# Patient Record
Sex: FEMALE | Race: WHITE | NOT HISPANIC OR LATINO
[De-identification: names, ages, dates, MRNs, and addresses within clinical notes are randomized per-mention and may not be internally consistent; named-entity substitution may affect disease eponyms.]

---

## 2017-03-08 PROBLEM — Z00.00 ENCOUNTER FOR PREVENTIVE HEALTH EXAMINATION: Status: ACTIVE | Noted: 2017-03-08

## 2017-03-09 ENCOUNTER — APPOINTMENT (OUTPATIENT)
Dept: SURGERY | Facility: CLINIC | Age: 37
End: 2017-03-09

## 2018-12-15 ENCOUNTER — OUTPATIENT (OUTPATIENT)
Dept: OUTPATIENT SERVICES | Facility: HOSPITAL | Age: 38
LOS: 1 days | Discharge: HOME | End: 2018-12-15

## 2018-12-15 DIAGNOSIS — R52 PAIN, UNSPECIFIED: ICD-10-CM

## 2018-12-17 ENCOUNTER — TRANSCRIPTION ENCOUNTER (OUTPATIENT)
Age: 38
End: 2018-12-17

## 2019-01-30 ENCOUNTER — OUTPATIENT (OUTPATIENT)
Dept: OUTPATIENT SERVICES | Facility: HOSPITAL | Age: 39
LOS: 1 days | Discharge: HOME | End: 2019-01-30

## 2019-01-30 DIAGNOSIS — E55.9 VITAMIN D DEFICIENCY, UNSPECIFIED: ICD-10-CM

## 2019-01-30 DIAGNOSIS — Z00.00 ENCOUNTER FOR GENERAL ADULT MEDICAL EXAMINATION WITHOUT ABNORMAL FINDINGS: ICD-10-CM

## 2019-02-05 ENCOUNTER — APPOINTMENT (OUTPATIENT)
Dept: GYNECOLOGIC ONCOLOGY | Facility: CLINIC | Age: 39
End: 2019-02-05

## 2019-02-05 ENCOUNTER — OUTPATIENT (OUTPATIENT)
Dept: OUTPATIENT SERVICES | Facility: HOSPITAL | Age: 39
LOS: 1 days | Discharge: HOME | End: 2019-02-05

## 2019-02-05 VITALS
DIASTOLIC BLOOD PRESSURE: 91 MMHG | BODY MASS INDEX: 32.2 KG/M2 | TEMPERATURE: 97.3 F | RESPIRATION RATE: 18 BRPM | HEIGHT: 62 IN | HEART RATE: 74 BPM | WEIGHT: 175 LBS | SYSTOLIC BLOOD PRESSURE: 140 MMHG

## 2019-02-05 DIAGNOSIS — Z80.49 FAMILY HISTORY OF MALIGNANT NEOPLASM OF OTHER GENITAL ORGANS: ICD-10-CM

## 2019-03-07 DIAGNOSIS — D25.9 LEIOMYOMA OF UTERUS, UNSPECIFIED: ICD-10-CM

## 2019-03-08 ENCOUNTER — TRANSCRIPTION ENCOUNTER (OUTPATIENT)
Age: 39
End: 2019-03-08

## 2019-03-12 ENCOUNTER — APPOINTMENT (OUTPATIENT)
Dept: GYNECOLOGIC ONCOLOGY | Facility: CLINIC | Age: 39
End: 2019-03-12

## 2019-03-12 NOTE — DISCUSSION/SUMMARY
[FreeTextEntry1] : 40 yo P2 with urinary urgency/frequency and pelvic pressure\par - symptoms possibly due to fibroid uterus however unlikely due to decreasing fibroid size. \par - recc f/u with urogynecology for urinary symptoms, possibly urodynamics\par - RTC 6-8 weeks

## 2019-03-12 NOTE — HISTORY OF PRESENT ILLNESS
[FreeTextEntry1] : 40yo with h/o chronic pelvic pain \par \par 12/15/2018: TVUS: enlarged heterogenous uterus, 11x6x5.6cm with submucosal calcified fibroid 3.6x3.2x3.9cm. ES 0.44cm. Right ovary with 1.3c, septated cyst. Left ovary WNL. No adnexal masses of free pelvic fluid. \par

## 2019-03-12 NOTE — HISTORY OF PRESENT ILLNESS
[FreeTextEntry1] : 40 yo P2, referred from Dr. Jack for pelvic pain\par \par Reports pelvic pressure x 3 months, unchanged, worse at night. Reports pelvic pain, dull/aching, 5/10 worse at night when laying supine, denies previous h/o similar pain. Denies taking medication for pain. \par Does not feel symptoms are related to bowel/urinary habits. \par Periods are regular, LMP 19. Denies any AUB. Reports h/o ovarian cysts that have been small (< 3cm) and resolved on their own. Sexually active, on OCP's x 1 month. \par H/o of fibroid previously 8 cm decreasing in size to 5 cm. \par Reports urinary frequency, 20 voids during the day, and nocturia (2x nightly). Denies dysuria, hematuria. Reports pelvic pressure sensation unrelated to feeling of urge to urinate. Drinks coffee 2 cups daily. Reports leaking of urine with urgency and leaking with coughing. Denies sensation of incomplete emptying. Bowel movements are regular, currently with some constipation. \par Last pap smear: 2019: NILM\par denies any weight changes. \par \par Imagin/15/2018: TVUS: enlarged heterogenous uterus, 11x6x5.6cm with submucosal calcified fibroid 3.6x3.2x3.9cm. ES 0.44cm. Right ovary with 1.3c, septated cyst. Left ovary WNL. No adnexal masses of free pelvic fluid.

## 2019-03-16 ENCOUNTER — EMERGENCY (EMERGENCY)
Facility: HOSPITAL | Age: 39
LOS: 0 days | Discharge: HOME | End: 2019-03-16
Attending: EMERGENCY MEDICINE | Admitting: EMERGENCY MEDICINE

## 2019-03-16 VITALS
OXYGEN SATURATION: 97 % | DIASTOLIC BLOOD PRESSURE: 87 MMHG | TEMPERATURE: 98 F | RESPIRATION RATE: 20 BRPM | SYSTOLIC BLOOD PRESSURE: 141 MMHG | HEART RATE: 94 BPM

## 2019-03-16 DIAGNOSIS — Z79.2 LONG TERM (CURRENT) USE OF ANTIBIOTICS: ICD-10-CM

## 2019-03-16 DIAGNOSIS — H57.89 OTHER SPECIFIED DISORDERS OF EYE AND ADNEXA: ICD-10-CM

## 2019-03-16 DIAGNOSIS — L03.213 PERIORBITAL CELLULITIS: ICD-10-CM

## 2019-03-16 DIAGNOSIS — H10.9 UNSPECIFIED CONJUNCTIVITIS: ICD-10-CM

## 2019-03-16 NOTE — ED PROVIDER NOTE - NSFOLLOWUPINSTRUCTIONS_ED_ALL_ED_FT
Cellulitis    Cellulitis is a skin infection caused by bacteria. This condition occurs most often in the arms and lower legs but can occur anywhere over the body. Symptoms include redness, swelling, warm skin, tenderness, and chills/fever. If you were prescribed an antibiotic medicine, take it as told by your health care provider. Do not stop taking the antibiotic even if you start to feel better.    SEEK IMMEDIATE MEDICAL CARE IF YOU HAVE ANY OF THE FOLLOWING SYMPTOMS: worsening fever, red streaks coming from affected area, vomiting or diarrhea, or dizziness/lightheadedness.     Conjunctivitis    Conjunctivitis is an inflammation of the clear membrane that covers the white part of your eye and the inner surface of your eyelid (conjunctiva). Symptoms include eye redness, eye pain, tearing and drainage, crusting of eyelids, swollen eyelids, and light sensitivity. Conjunctivitis may be contagious and easily spread from person to person. It can be caused by a virus, bacteria, or as part of an allergic reaction; the treatment depends on the type of conjunctivitis suspected. Avoid touching or rubbing your eyes and wipe away any drainage gently with a warm wet washcloth. Do not wear contact lenses until the inflammation is gone – wear glasses until your health care provider says it is safe to wear contact again. Do not share towels or washcloths that may spread the infection and wash your hands frequently.    SEEK IMMEDIATE MEDICAL CARE IF YOU HAVE ANY OF THE FOLLOWING SYMPTOMS: increasing pain, blurry vision, blindness, fever, or facial pain/redness/swelling.

## 2019-03-16 NOTE — ED PROVIDER NOTE - OBJECTIVE STATEMENT
39F p/w R eye redness. Per pt she began having eye redness, FB sensation and yellow discharge on Wednesday. Pt was diagnosed w/ conjunctivitis by PMD and started on ofloxacin drops. 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 Pt came to ED tonight because she felt like she had something in her eye, however the sensation resolved soon after arriving to ED. Pt denies f/c, pain w/ EOM, decreased vision, contact lens use.

## 2019-03-16 NOTE — ED PROVIDER NOTE - PHYSICAL EXAMINATION
General: AOx4, Non toxic appearing, NAD, speaking in full sentences.   Skin: mild erythema, swelling and redness around R eye  Head:  Normocephalic, atraumatic.   EENT: PERRL/EOMI w/out pain. R eye conjunctiva and sclera clear injected w/ watery and scant yellowish discharge. No FB visualized. Visual acuity intact.   Psych: Cooperative, appropriate

## 2019-03-16 NOTE — ED ADULT NURSE NOTE - OBJECTIVE STATEMENT
Eye redness with yellow drainage. Was diagnosed with conjunctivitis by md with no resolution after abx. Presented with eye swelling as well. no visual changes, halos, floaters.

## 2019-03-16 NOTE — ED PROVIDER NOTE - ATTENDING CONTRIBUTION TO CARE
pt with conjunctivitis, on po and gtts abx, felt fb in eye. relief while in ED. no pain with eom. no fever. ptin nad, injected rt eye with mild periorb edema. nt. no proptosis. no fb seen onlid eversion. no abrasion. cornea and iris nml. no pus. va nml. no deficits.  will dc home, outpt f/u.

## 2019-03-16 NOTE — ED PROVIDER NOTE - NSFOLLOWUPCLINICS_GEN_ALL_ED_FT
[facility]  Ophthalmolgy  [address], [address]  [place] [address]  Phone: [phone]  Fax:   Follow Up Time:

## 2020-04-19 ENCOUNTER — RESULT REVIEW (OUTPATIENT)
Age: 40
End: 2020-04-19

## 2020-04-26 ENCOUNTER — MESSAGE (OUTPATIENT)
Age: 40
End: 2020-04-26

## 2020-05-05 ENCOUNTER — APPOINTMENT (OUTPATIENT)
Dept: DISASTER EMERGENCY | Facility: CLINIC | Age: 40
End: 2020-05-05

## 2020-05-11 ENCOUNTER — APPOINTMENT (OUTPATIENT)
Dept: CARDIOLOGY | Facility: CLINIC | Age: 40
End: 2020-05-11
Payer: COMMERCIAL

## 2020-05-11 VITALS — DIASTOLIC BLOOD PRESSURE: 101 MMHG | HEART RATE: 67 BPM | SYSTOLIC BLOOD PRESSURE: 160 MMHG

## 2020-05-11 PROCEDURE — 99204 OFFICE O/P NEW MOD 45 MIN: CPT | Mod: 95

## 2020-05-11 RX ORDER — LABETALOL HYDROCHLORIDE 100 MG/1
100 TABLET, FILM COATED ORAL
Refills: 0 | Status: DISCONTINUED | COMMUNITY
Start: 2020-05-11 | End: 2020-05-11

## 2020-05-11 NOTE — HISTORY OF PRESENT ILLNESS
[FreeTextEntry1] : The patient has had no previous cardiac history . She did not have history of HTN . The patient had a   . It was noticed that at her 6 week post partum appointment was on 2020 . The patient was noted to have a BP of 140/90. She was started on Labetalol and this was increased to 200 mg BID and 100 mg at mid day . The patient said that she had some proteinuria . She had no definitive edema

## 2020-05-11 NOTE — REVIEW OF SYSTEMS
[Shortness Of Breath] : shortness of breath [Chest Pain] : chest pain [Negative] : Heme/Lymph [Palpitations] : no palpitations

## 2020-05-11 NOTE — PHYSICAL EXAM
[General Appearance - Well Developed] : well developed [Normal Appearance] : normal appearance [Well Groomed] : well groomed [General Appearance - Well Nourished] : well nourished [No Deformities] : no deformities [General Appearance - In No Acute Distress] : no acute distress [Eyelids - No Xanthelasma] : the eyelids demonstrated no xanthelasmas [Normal Conjunctiva] : the conjunctiva exhibited no abnormalities [Normal Oral Mucosa] : normal oral mucosa [No Oral Cyanosis] : no oral cyanosis [No Oral Pallor] : no oral pallor [Respiration, Rhythm And Depth] : normal respiratory rhythm and effort [Exaggerated Use Of Accessory Muscles For Inspiration] : no accessory muscle use [Cyanosis, Localized] : no localized cyanosis [Nail Clubbing] : no clubbing of the fingernails [Petechial Hemorrhages (___cm)] : no petechial hemorrhages [Skin Color & Pigmentation] : normal skin color and pigmentation [] : no rash [Skin Lesions] : no skin lesions [No Venous Stasis] : no venous stasis [No Skin Ulcers] : no skin ulcer [No Xanthoma] : no  xanthoma was observed [Oriented To Time, Place, And Person] : oriented to person, place, and time [Affect] : the affect was normal [Mood] : the mood was normal [No Anxiety] : not feeling anxious [FreeTextEntry1] : No JVD

## 2020-05-20 LAB
ALBUMIN SERPL ELPH-MCNC: 4.6 G/DL
ALP BLD-CCNC: 86 U/L
ALT SERPL-CCNC: 40 U/L
ANION GAP SERPL CALC-SCNC: 15 MMOL/L
AST SERPL-CCNC: 26 U/L
BASOPHILS # BLD AUTO: 0.05 K/UL
BASOPHILS NFR BLD AUTO: 0.9 %
BILIRUB SERPL-MCNC: 0.3 MG/DL
BUN SERPL-MCNC: 13 MG/DL
CALCIUM SERPL-MCNC: 9.1 MG/DL
CHLORIDE SERPL-SCNC: 98 MMOL/L
CHOLEST SERPL-MCNC: 196 MG/DL
CHOLEST/HDLC SERPL: 4.9 RATIO
CO2 SERPL-SCNC: 24 MMOL/L
CREAT SERPL-MCNC: 0.7 MG/DL
EOSINOPHIL # BLD AUTO: 0.08 K/UL
EOSINOPHIL NFR BLD AUTO: 1.4 %
GLUCOSE SERPL-MCNC: 80 MG/DL
HCT VFR BLD CALC: 38.3 %
HDLC SERPL-MCNC: 40 MG/DL
HGB BLD-MCNC: 12.6 G/DL
IMM GRANULOCYTES NFR BLD AUTO: 0.2 %
LDLC SERPL CALC-MCNC: 140 MG/DL
LYMPHOCYTES # BLD AUTO: 2.11 K/UL
LYMPHOCYTES NFR BLD AUTO: 37.7 %
MAN DIFF?: NORMAL
MCHC RBC-ENTMCNC: 28.1 PG
MCHC RBC-ENTMCNC: 32.9 G/DL
MCV RBC AUTO: 85.3 FL
MONOCYTES # BLD AUTO: 0.68 K/UL
MONOCYTES NFR BLD AUTO: 12.1 %
NEUTROPHILS # BLD AUTO: 2.67 K/UL
NEUTROPHILS NFR BLD AUTO: 47.7 %
PLATELET # BLD AUTO: 285 K/UL
POTASSIUM SERPL-SCNC: 4.2 MMOL/L
PROT SERPL-MCNC: 7.2 G/DL
RBC # BLD: 4.49 M/UL
RBC # FLD: 12.9 %
SODIUM SERPL-SCNC: 137 MMOL/L
TRIGL SERPL-MCNC: 163 MG/DL
WBC # FLD AUTO: 5.6 K/UL

## 2020-05-26 ENCOUNTER — APPOINTMENT (OUTPATIENT)
Dept: CARDIOLOGY | Facility: CLINIC | Age: 40
End: 2020-05-26
Payer: COMMERCIAL

## 2020-05-26 VITALS — DIASTOLIC BLOOD PRESSURE: 80 MMHG | SYSTOLIC BLOOD PRESSURE: 120 MMHG

## 2020-05-26 VITALS
BODY MASS INDEX: 36.07 KG/M2 | DIASTOLIC BLOOD PRESSURE: 80 MMHG | HEIGHT: 62 IN | WEIGHT: 196 LBS | HEART RATE: 76 BPM | SYSTOLIC BLOOD PRESSURE: 130 MMHG | TEMPERATURE: 98 F

## 2020-05-26 LAB
T4 FREE SERPL-MCNC: 1 NG/DL
TSH SERPL-ACNC: 3.15 UIU/ML

## 2020-05-26 PROCEDURE — 99214 OFFICE O/P EST MOD 30 MIN: CPT

## 2020-05-26 PROCEDURE — 93000 ELECTROCARDIOGRAM COMPLETE: CPT

## 2020-05-26 PROCEDURE — 93306 TTE W/DOPPLER COMPLETE: CPT

## 2020-05-26 NOTE — PHYSICAL EXAM
[General Appearance - Well Developed] : well developed [Normal Appearance] : normal appearance [Well Groomed] : well groomed [General Appearance - Well Nourished] : well nourished [No Deformities] : no deformities [General Appearance - In No Acute Distress] : no acute distress [Normal Conjunctiva] : the conjunctiva exhibited no abnormalities [Eyelids - No Xanthelasma] : the eyelids demonstrated no xanthelasmas [Normal Oral Mucosa] : normal oral mucosa [No Oral Pallor] : no oral pallor [No Oral Cyanosis] : no oral cyanosis [Respiration, Rhythm And Depth] : normal respiratory rhythm and effort [Exaggerated Use Of Accessory Muscles For Inspiration] : no accessory muscle use [Nail Clubbing] : no clubbing of the fingernails [Cyanosis, Localized] : no localized cyanosis [Petechial Hemorrhages (___cm)] : no petechial hemorrhages [Skin Color & Pigmentation] : normal skin color and pigmentation [] : no rash [Skin Lesions] : no skin lesions [No Venous Stasis] : no venous stasis [No Xanthoma] : no  xanthoma was observed [No Skin Ulcers] : no skin ulcer [Mood] : the mood was normal [Affect] : the affect was normal [Oriented To Time, Place, And Person] : oriented to person, place, and time [No Anxiety] : not feeling anxious [FreeTextEntry1] : No JVD

## 2020-05-26 NOTE — ASSESSMENT
[FreeTextEntry1] : The patient has had HTN of pregnancy possible preeclampsia . BP has been well controlled on current medication . No edema . No SOB . Had recent echo at [facility] outpatient facility .

## 2020-05-26 NOTE — HISTORY OF PRESENT ILLNESS
[FreeTextEntry1] : The patient has had no previous cardiac history . She did not have history of HTN . The patient had a   . It was noticed that at her 6 week post partum appointment was on 2020 . The patient was noted to have a BP of 140/90. She was started on Labetalol and this was increased to 200 mg BID and 100 mg at mid day . The patient said that she had some proteinuria . She had no definitive edema \par The patient 's BP has been well controlled on her home BP monitor . No SOB

## 2020-06-22 ENCOUNTER — APPOINTMENT (OUTPATIENT)
Dept: CARDIOLOGY | Facility: CLINIC | Age: 40
End: 2020-06-22
Payer: COMMERCIAL

## 2020-06-22 VITALS — SYSTOLIC BLOOD PRESSURE: 128 MMHG | DIASTOLIC BLOOD PRESSURE: 70 MMHG

## 2020-06-22 VITALS — TEMPERATURE: 98.6 F | HEART RATE: 69 BPM | HEIGHT: 60 IN

## 2020-06-22 VITALS — WEIGHT: 190 LBS | BODY MASS INDEX: 34.96 KG/M2 | HEIGHT: 62 IN

## 2020-06-22 DIAGNOSIS — R35.0 FREQUENCY OF MICTURITION: ICD-10-CM

## 2020-06-22 DIAGNOSIS — R10.2 PELVIC AND PERINEAL PAIN: ICD-10-CM

## 2020-06-22 DIAGNOSIS — Z78.9 OTHER SPECIFIED HEALTH STATUS: ICD-10-CM

## 2020-06-22 PROCEDURE — 93000 ELECTROCARDIOGRAM COMPLETE: CPT

## 2020-06-22 PROCEDURE — 99213 OFFICE O/P EST LOW 20 MIN: CPT

## 2020-06-22 NOTE — REVIEW OF SYSTEMS
[Chest Pain] : chest pain [Shortness Of Breath] : shortness of breath [Negative] : Heme/Lymph [Palpitations] : no palpitations

## 2020-06-22 NOTE — PHYSICAL EXAM
[Normal Appearance] : normal appearance [General Appearance - Well Developed] : well developed [Well Groomed] : well groomed [General Appearance - Well Nourished] : well nourished [No Deformities] : no deformities [Eyelids - No Xanthelasma] : the eyelids demonstrated no xanthelasmas [Normal Conjunctiva] : the conjunctiva exhibited no abnormalities [General Appearance - In No Acute Distress] : no acute distress [No Oral Pallor] : no oral pallor [Normal Oral Mucosa] : normal oral mucosa [No Oral Cyanosis] : no oral cyanosis [Exaggerated Use Of Accessory Muscles For Inspiration] : no accessory muscle use [Respiration, Rhythm And Depth] : normal respiratory rhythm and effort [Nail Clubbing] : no clubbing of the fingernails [Cyanosis, Localized] : no localized cyanosis [Petechial Hemorrhages (___cm)] : no petechial hemorrhages [Skin Color & Pigmentation] : normal skin color and pigmentation [] : no rash [No Venous Stasis] : no venous stasis [Skin Lesions] : no skin lesions [No Skin Ulcers] : no skin ulcer [No Xanthoma] : no  xanthoma was observed [Oriented To Time, Place, And Person] : oriented to person, place, and time [Mood] : the mood was normal [Affect] : the affect was normal [No Anxiety] : not feeling anxious [FreeTextEntry1] : No JVD

## 2020-07-10 ENCOUNTER — EMERGENCY (EMERGENCY)
Facility: HOSPITAL | Age: 40
LOS: 0 days | Discharge: HOME | End: 2020-07-10
Attending: EMERGENCY MEDICINE | Admitting: EMERGENCY MEDICINE
Payer: COMMERCIAL

## 2020-07-10 VITALS
HEART RATE: 84 BPM | DIASTOLIC BLOOD PRESSURE: 86 MMHG | OXYGEN SATURATION: 100 % | TEMPERATURE: 98 F | SYSTOLIC BLOOD PRESSURE: 144 MMHG | WEIGHT: 184.97 LBS | RESPIRATION RATE: 16 BRPM

## 2020-07-10 DIAGNOSIS — R10.13 EPIGASTRIC PAIN: ICD-10-CM

## 2020-07-10 DIAGNOSIS — R10.9 UNSPECIFIED ABDOMINAL PAIN: ICD-10-CM

## 2020-07-10 DIAGNOSIS — Z91.018 ALLERGY TO OTHER FOODS: ICD-10-CM

## 2020-07-10 DIAGNOSIS — R11.0 NAUSEA: ICD-10-CM

## 2020-07-10 LAB
ALBUMIN SERPL ELPH-MCNC: 4.4 G/DL — SIGNIFICANT CHANGE UP (ref 3.5–5.2)
ALP SERPL-CCNC: 72 U/L — SIGNIFICANT CHANGE UP (ref 30–115)
ALT FLD-CCNC: 19 U/L — SIGNIFICANT CHANGE UP (ref 0–41)
ANION GAP SERPL CALC-SCNC: 12 MMOL/L — SIGNIFICANT CHANGE UP (ref 7–14)
APPEARANCE UR: CLEAR — SIGNIFICANT CHANGE UP
AST SERPL-CCNC: 17 U/L — SIGNIFICANT CHANGE UP (ref 0–41)
BACTERIA # UR AUTO: NEGATIVE — SIGNIFICANT CHANGE UP
BASOPHILS # BLD AUTO: 0.03 K/UL — SIGNIFICANT CHANGE UP (ref 0–0.2)
BASOPHILS NFR BLD AUTO: 0.5 % — SIGNIFICANT CHANGE UP (ref 0–1)
BILIRUB SERPL-MCNC: 0.4 MG/DL — SIGNIFICANT CHANGE UP (ref 0.2–1.2)
BILIRUB UR-MCNC: NEGATIVE — SIGNIFICANT CHANGE UP
BUN SERPL-MCNC: 14 MG/DL — SIGNIFICANT CHANGE UP (ref 10–20)
CALCIUM SERPL-MCNC: 9.1 MG/DL — SIGNIFICANT CHANGE UP (ref 8.5–10.1)
CHLORIDE SERPL-SCNC: 101 MMOL/L — SIGNIFICANT CHANGE UP (ref 98–110)
CO2 SERPL-SCNC: 26 MMOL/L — SIGNIFICANT CHANGE UP (ref 17–32)
COLOR SPEC: YELLOW — SIGNIFICANT CHANGE UP
CREAT SERPL-MCNC: 0.8 MG/DL — SIGNIFICANT CHANGE UP (ref 0.7–1.5)
DIFF PNL FLD: SIGNIFICANT CHANGE UP
EOSINOPHIL # BLD AUTO: 0.06 K/UL — SIGNIFICANT CHANGE UP (ref 0–0.7)
EOSINOPHIL NFR BLD AUTO: 1 % — SIGNIFICANT CHANGE UP (ref 0–8)
EPI CELLS # UR: 10 /HPF — HIGH (ref 0–5)
GLUCOSE SERPL-MCNC: 95 MG/DL — SIGNIFICANT CHANGE UP (ref 70–99)
GLUCOSE UR QL: NEGATIVE — SIGNIFICANT CHANGE UP
HCT VFR BLD CALC: 37.8 % — SIGNIFICANT CHANGE UP (ref 37–47)
HGB BLD-MCNC: 12.8 G/DL — SIGNIFICANT CHANGE UP (ref 12–16)
HYALINE CASTS # UR AUTO: 6 /LPF — SIGNIFICANT CHANGE UP (ref 0–7)
IMM GRANULOCYTES NFR BLD AUTO: 0.2 % — SIGNIFICANT CHANGE UP (ref 0.1–0.3)
KETONES UR-MCNC: SIGNIFICANT CHANGE UP
LACTATE SERPL-SCNC: 0.7 MMOL/L — SIGNIFICANT CHANGE UP (ref 0.7–2)
LEUKOCYTE ESTERASE UR-ACNC: NEGATIVE — SIGNIFICANT CHANGE UP
LIDOCAIN IGE QN: 40 U/L — SIGNIFICANT CHANGE UP (ref 7–60)
LYMPHOCYTES # BLD AUTO: 2.38 K/UL — SIGNIFICANT CHANGE UP (ref 1.2–3.4)
LYMPHOCYTES # BLD AUTO: 41 % — SIGNIFICANT CHANGE UP (ref 20.5–51.1)
MCHC RBC-ENTMCNC: 28.2 PG — SIGNIFICANT CHANGE UP (ref 27–31)
MCHC RBC-ENTMCNC: 33.9 G/DL — SIGNIFICANT CHANGE UP (ref 32–37)
MCV RBC AUTO: 83.3 FL — SIGNIFICANT CHANGE UP (ref 81–99)
MONOCYTES # BLD AUTO: 0.51 K/UL — SIGNIFICANT CHANGE UP (ref 0.1–0.6)
MONOCYTES NFR BLD AUTO: 8.8 % — SIGNIFICANT CHANGE UP (ref 1.7–9.3)
NEUTROPHILS # BLD AUTO: 2.81 K/UL — SIGNIFICANT CHANGE UP (ref 1.4–6.5)
NEUTROPHILS NFR BLD AUTO: 48.5 % — SIGNIFICANT CHANGE UP (ref 42.2–75.2)
NITRITE UR-MCNC: NEGATIVE — SIGNIFICANT CHANGE UP
NRBC # BLD: 0 /100 WBCS — SIGNIFICANT CHANGE UP (ref 0–0)
PH UR: 6 — SIGNIFICANT CHANGE UP (ref 5–8)
PLATELET # BLD AUTO: 288 K/UL — SIGNIFICANT CHANGE UP (ref 130–400)
POTASSIUM SERPL-MCNC: 4.1 MMOL/L — SIGNIFICANT CHANGE UP (ref 3.5–5)
POTASSIUM SERPL-SCNC: 4.1 MMOL/L — SIGNIFICANT CHANGE UP (ref 3.5–5)
PROT SERPL-MCNC: 7.2 G/DL — SIGNIFICANT CHANGE UP (ref 6–8)
PROT UR-MCNC: ABNORMAL
RBC # BLD: 4.54 M/UL — SIGNIFICANT CHANGE UP (ref 4.2–5.4)
RBC # FLD: 12.8 % — SIGNIFICANT CHANGE UP (ref 11.5–14.5)
RBC CASTS # UR COMP ASSIST: 2 /HPF — SIGNIFICANT CHANGE UP (ref 0–4)
SODIUM SERPL-SCNC: 139 MMOL/L — SIGNIFICANT CHANGE UP (ref 135–146)
SP GR SPEC: 1.03 — HIGH (ref 1.01–1.02)
UROBILINOGEN FLD QL: SIGNIFICANT CHANGE UP
WBC # BLD: 5.8 K/UL — SIGNIFICANT CHANGE UP (ref 4.8–10.8)
WBC # FLD AUTO: 5.8 K/UL — SIGNIFICANT CHANGE UP (ref 4.8–10.8)
WBC UR QL: 3 /HPF — SIGNIFICANT CHANGE UP (ref 0–5)

## 2020-07-10 PROCEDURE — 93010 ELECTROCARDIOGRAM REPORT: CPT

## 2020-07-10 PROCEDURE — 99285 EMERGENCY DEPT VISIT HI MDM: CPT

## 2020-07-10 PROCEDURE — 76705 ECHO EXAM OF ABDOMEN: CPT | Mod: 26

## 2020-07-10 NOTE — ED PROVIDER NOTE - PATIENT PORTAL LINK FT
You can access the FollowMyHealth Patient Portal offered by [facility] by registering at the following website: [email or web address]. By joining [facility] FollowMyHealth portal, you will also be able to view your health information using other applications (apps) compatible with our system.

## 2020-07-10 NOTE — ED PROVIDER NOTE - PHYSICAL EXAMINATION
CONSTITUTIONAL: Well-developed; well-nourished; in no acute distress, nontoxic appearing, talking comfortably in full sentences  SKIN: skin exam is warm and dry,  HEAD: Normocephalic; atraumatic.  EYES: PERRL, 3 mm bilateral, no nystagmus, EOM intact; conjunctiva and sclera clear.  ENT: MMM, no nasal congestion  NECK: Supple; non tender.+ full passive ROM in all directions. No JVD  CARD: S1, S2 normal, no murmur  RESP: No wheezes, rales or rhonchi. Good air movement bilaterally  ABD: soft; non-distended; + mild epigastric ttp, no guarding or rebound, no cvat b/l   EXT: Normal ROM. No cyanosis or edema. Dp Pulses intact.   NEURO: awake, alert, following commands, oriented, grossly unremarkable. No Focal deficits. GCS 15.   PSYCH: Cooperative, appropriate.

## 2020-07-10 NOTE — ED PROVIDER NOTE - OBJECTIVE STATEMENT
40 F with hx of post partum preeclampsia on Labetalol and nifedipine with complaints of 2 days of intermittent non radiating epigastric abd pain. Associated with mild nausea, no vomiting. Denies symptoms associated with eating. No fever, diarrhea, urinary complaints. Menstrual period regular. Pshx of appendectomy.

## 2020-07-10 NOTE — ED PROVIDER NOTE - CLINICAL SUMMARY MEDICAL DECISION MAKING FREE TEXT BOX
Pt presented with 2 days of epigastric abd pain. Required labs, imaging. No acute findings. Will be d/tex with outpt f/up.    ED evaluation and management discussed with the patient and family (if available) in detail.  Close PMD follow up encouraged.  Strict ED return instructions discussed in detail and patient given the opportunity to ask any questions about their discharge diagnosis and instructions. Patient verbalized understanding.

## 2020-07-10 NOTE — ED ADULT NURSE NOTE - NSIMPLEMENTINTERV_GEN_ALL_ED
Implemented All Universal Safety Interventions:  [place]illow to call system. Call bell, personal items and telephone within reach. Instruct patient to call for assistance. Room bathroom lighting operational. Non-slip footwear when patient is off stretcher. Physically safe environment: no spills, clutter or unnecessary equipment. Stretcher in lowest position, wheels locked, appropriate side rails in place.

## 2020-07-10 NOTE — ED PROVIDER NOTE - CARE PROVIDER_API CALL
Wilmer Lyles  GASTROENTEROLOGY  [address] [place] [address]  Phone: [phone]  Fax: [phone]  Follow Up Time: 7-10 Days

## 2020-07-10 NOTE — ED ADULT TRIAGE NOTE - MEANS OF ARRIVAL
Pt lying in bed resting at this time. Respirations even and unlabored. No s/sx of pain or distress. Report to be given to oncoming nurse. ambulatory

## 2020-07-28 ENCOUNTER — APPOINTMENT (OUTPATIENT)
Dept: CARDIOLOGY | Facility: CLINIC | Age: 40
End: 2020-07-28
Payer: COMMERCIAL

## 2020-07-28 VITALS — SYSTOLIC BLOOD PRESSURE: 118 MMHG | DIASTOLIC BLOOD PRESSURE: 78 MMHG

## 2020-07-28 VITALS
WEIGHT: 186 LBS | HEIGHT: 62 IN | BODY MASS INDEX: 34.23 KG/M2 | DIASTOLIC BLOOD PRESSURE: 78 MMHG | SYSTOLIC BLOOD PRESSURE: 118 MMHG | TEMPERATURE: 98.1 F

## 2020-07-28 PROCEDURE — 99213 OFFICE O/P EST LOW 20 MIN: CPT

## 2020-07-28 RX ORDER — NIFEDIPINE 30 MG/1
30 TABLET, EXTENDED RELEASE ORAL
Qty: 30 | Refills: 6 | Status: DISCONTINUED | COMMUNITY
Start: 2020-05-11 | End: 2020-07-28

## 2020-07-28 NOTE — ASSESSMENT
[FreeTextEntry1] : The patient has had good control of her BP . At time she goes as low as 100 systolic . Did not feel well when she had that BP .

## 2020-07-28 NOTE — HISTORY OF PRESENT ILLNESS
[FreeTextEntry1] : The patient has had good control of BP at home . some dizziness when she bends down and stands back up .

## 2020-07-28 NOTE — PHYSICAL EXAM
[General Appearance - Well Developed] : well developed [Normal Appearance] : normal appearance [General Appearance - Well Nourished] : well nourished [No Deformities] : no deformities [Well Groomed] : well groomed [Normal Conjunctiva] : the conjunctiva exhibited no abnormalities [General Appearance - In No Acute Distress] : no acute distress [Eyelids - No Xanthelasma] : the eyelids demonstrated no xanthelasmas [Normal Oral Mucosa] : normal oral mucosa [No Oral Cyanosis] : no oral cyanosis [No Oral Pallor] : no oral pallor [Respiration, Rhythm And Depth] : normal respiratory rhythm and effort [Exaggerated Use Of Accessory Muscles For Inspiration] : no accessory muscle use [Cyanosis, Localized] : no localized cyanosis [Nail Clubbing] : no clubbing of the fingernails [Petechial Hemorrhages (___cm)] : no petechial hemorrhages [Skin Color & Pigmentation] : normal skin color and pigmentation [No Venous Stasis] : no venous stasis [] : no rash [Skin Lesions] : no skin lesions [No Skin Ulcers] : no skin ulcer [No Xanthoma] : no  xanthoma was observed [Affect] : the affect was normal [Oriented To Time, Place, And Person] : oriented to person, place, and time [Mood] : the mood was normal [No Anxiety] : not feeling anxious [FreeTextEntry1] : No JVD

## 2020-08-08 ENCOUNTER — APPOINTMENT (OUTPATIENT)
Dept: OBGYN | Facility: CLINIC | Age: 40
End: 2020-08-08
Payer: COMMERCIAL

## 2020-08-08 PROCEDURE — 81002 URINALYSIS NONAUTO W/O SCOPE: CPT

## 2020-08-08 PROCEDURE — 99213 OFFICE O/P EST LOW 20 MIN: CPT

## 2020-08-24 ENCOUNTER — APPOINTMENT (OUTPATIENT)
Dept: CARDIOLOGY | Facility: CLINIC | Age: 40
End: 2020-08-24
Payer: COMMERCIAL

## 2020-08-24 VITALS — WEIGHT: 186 LBS | HEIGHT: 62 IN | TEMPERATURE: 97.3 F | BODY MASS INDEX: 34.23 KG/M2

## 2020-08-24 VITALS — SYSTOLIC BLOOD PRESSURE: 118 MMHG | DIASTOLIC BLOOD PRESSURE: 82 MMHG

## 2020-08-24 VITALS — DIASTOLIC BLOOD PRESSURE: 82 MMHG | HEART RATE: 80 BPM | SYSTOLIC BLOOD PRESSURE: 118 MMHG

## 2020-08-24 PROCEDURE — 99213 OFFICE O/P EST LOW 20 MIN: CPT

## 2020-08-24 NOTE — HISTORY OF PRESENT ILLNESS
[FreeTextEntry1] : The patient has had good control of BP at home .  Increased here on home moitor only . For the most part her BP has been normal.

## 2020-08-24 NOTE — PHYSICAL EXAM
[General Appearance - Well Developed] : well developed [Normal Appearance] : normal appearance [Well Groomed] : well groomed [General Appearance - Well Nourished] : well nourished [No Deformities] : no deformities [General Appearance - In No Acute Distress] : no acute distress [Normal Conjunctiva] : the conjunctiva exhibited no abnormalities [Eyelids - No Xanthelasma] : the eyelids demonstrated no xanthelasmas [Normal Oral Mucosa] : normal oral mucosa [No Oral Pallor] : no oral pallor [No Oral Cyanosis] : no oral cyanosis [Respiration, Rhythm And Depth] : normal respiratory rhythm and effort [Exaggerated Use Of Accessory Muscles For Inspiration] : no accessory muscle use [Cyanosis, Localized] : no localized cyanosis [Nail Clubbing] : no clubbing of the fingernails [] : no rash [Petechial Hemorrhages (___cm)] : no petechial hemorrhages [Skin Color & Pigmentation] : normal skin color and pigmentation [No Venous Stasis] : no venous stasis [Skin Lesions] : no skin lesions [Oriented To Time, Place, And Person] : oriented to person, place, and time [No Skin Ulcers] : no skin ulcer [No Xanthoma] : no  xanthoma was observed [Affect] : the affect was normal [Mood] : the mood was normal [No Anxiety] : not feeling anxious [FreeTextEntry1] : No JVD

## 2020-10-30 ENCOUNTER — APPOINTMENT (OUTPATIENT)
Dept: OBGYN | Facility: CLINIC | Age: 40
End: 2020-10-30
Payer: SELF-PAY

## 2020-10-30 PROCEDURE — 99396 PREV VISIT EST AGE 40-64: CPT

## 2020-10-30 PROCEDURE — 81002 URINALYSIS NONAUTO W/O SCOPE: CPT

## 2020-10-30 PROCEDURE — 99072 ADDL SUPL MATRL&STAF TM PHE: CPT

## 2020-11-18 ENCOUNTER — APPOINTMENT (OUTPATIENT)
Dept: CARDIOLOGY | Facility: CLINIC | Age: 40
End: 2020-11-18
Payer: MEDICAID

## 2020-11-18 VITALS
BODY MASS INDEX: 34.23 KG/M2 | WEIGHT: 186 LBS | HEIGHT: 62 IN | DIASTOLIC BLOOD PRESSURE: 90 MMHG | SYSTOLIC BLOOD PRESSURE: 130 MMHG | TEMPERATURE: 95.6 F

## 2020-11-18 PROCEDURE — 99213 OFFICE O/P EST LOW 20 MIN: CPT

## 2020-11-18 NOTE — PHYSICAL EXAM
[General Appearance - Well Developed] : well developed [Normal Appearance] : normal appearance [Well Groomed] : well groomed [General Appearance - Well Nourished] : well nourished [No Deformities] : no deformities [General Appearance - In No Acute Distress] : no acute distress [Normal Conjunctiva] : the conjunctiva exhibited no abnormalities [Eyelids - No Xanthelasma] : the eyelids demonstrated no xanthelasmas [Normal Oral Mucosa] : normal oral mucosa [No Oral Pallor] : no oral pallor [No Oral Cyanosis] : no oral cyanosis [Respiration, Rhythm And Depth] : normal respiratory rhythm and effort [Exaggerated Use Of Accessory Muscles For Inspiration] : no accessory muscle use [Nail Clubbing] : no clubbing of the fingernails [Cyanosis, Localized] : no localized cyanosis [Petechial Hemorrhages (___cm)] : no petechial hemorrhages [Skin Color & Pigmentation] : normal skin color and pigmentation [] : no rash [No Venous Stasis] : no venous stasis [Skin Lesions] : no skin lesions [No Skin Ulcers] : no skin ulcer [No Xanthoma] : no  xanthoma was observed [Oriented To Time, Place, And Person] : oriented to person, place, and time [Affect] : the affect was normal [Mood] : the mood was normal [No Anxiety] : not feeling anxious [FreeTextEntry1] : No JVD

## 2020-12-03 ENCOUNTER — APPOINTMENT (OUTPATIENT)
Dept: GASTROENTEROLOGY | Facility: CLINIC | Age: 40
End: 2020-12-03

## 2020-12-10 ENCOUNTER — APPOINTMENT (OUTPATIENT)
Dept: NEUROLOGY | Facility: CLINIC | Age: 40
End: 2020-12-10
Payer: COMMERCIAL

## 2020-12-10 VITALS
TEMPERATURE: 97.1 F | OXYGEN SATURATION: 98 % | HEIGHT: 62 IN | DIASTOLIC BLOOD PRESSURE: 86 MMHG | SYSTOLIC BLOOD PRESSURE: 122 MMHG | HEART RATE: 105 BPM | WEIGHT: 180 LBS | BODY MASS INDEX: 33.13 KG/M2

## 2020-12-10 PROCEDURE — 99203 OFFICE O/P NEW LOW 30 MIN: CPT

## 2020-12-10 PROCEDURE — 99072 ADDL SUPL MATRL&STAF TM PHE: CPT

## 2020-12-10 NOTE — PHYSICAL EXAM
[FreeTextEntry1] : Physical examination:  \par General:   The patient is pleasant, cooperative, well dressed and in no acute distress.  Appearance is consistent with chronologic age.  No abnormal facies.\par Neurologic examination:  The patient is oriented to person, place, time and date.   Remote and recent memory is normal.   Fund of knowledge is intact and normal.  Language with normal repetition, comprehension and naming.  Nondysarthric. \par Cranial nerves examination: intact VA, VFF.  EOMI w/o nystagmus, skew or reported double vision.  PERRL.  No ptosis/weakness of eyelid closure.  Facial sensation is normal with normal bite.  No facial asymmetry.  Hearing grossly intact b/l.  Palate elevates midline.  Neck flexion/extension, SCM/Trap strength normal.  Tongue midline with full resistance.  \par Motor examination:   Normal tone, bulk and range of motion.  No tenderness, twitching, tremors or involuntary movements.      \par Formal Muscle Strength Testing: (MRC grade R/L) 5/5 ALEX, BB, TR, WF, WE, FF, FE; 5/5 ILP, QDS, HS, DF, PF.  Arises from sitting/squatting wnl.  Toe/heel walks.  \par Reflexes:   2+ b/l pectoralis, biceps, triceps, brachioradialis, patella and Achilles.  Plantar response downgoing b/l.  Jaw jerk, Nery, clonus absent.  \par Sensory examination:   Intact to light touch and pinprick, pain, temperature and proprioception and vibration in all extremities.    \par Cerebellum:   FTN/HKS intact with normal NITA in all limbs.   Gait is narrow based and normal with normal tandem.  Romberg (-).  No dysmetria or dysdiadokinesia.       \par sessile small lesion in the L occiput with TTP.  \par

## 2020-12-10 NOTE — ASSESSMENT
[FreeTextEntry1] : This is a 39 yo RHF w/ h/o pre-eclampsia/HTN currently p/w recurrent point TTP L occiput suspicious for lymphadenopathy r/o skull based lesion.  \par \par Recommendations:\par - Head CT NC\par - observe for triggers\par

## 2020-12-10 NOTE — HISTORY OF PRESENT ILLNESS
[FreeTextEntry1] : This is a 39 yo RHF currently p/w recurrent episodes of head pain localized to the L occiput with TTP and swelling lasting for a 3 - 4 days with spontaneous resolution.  Symptoms started last month and resolved now had similar symptoms this month.  Unclear if catamenial association.  Denies any radiation into the occiput or shoulder.  Denies any ear pain, tinnitus, hearing loss, vertigo or ataxia.  Pain localized and tender.  Denies any swelling or tenderness in other ricarda of the head or neck.  Denies any focal weakness or numbness.  Is otherwise in his usual state of health.

## 2020-12-21 ENCOUNTER — APPOINTMENT (OUTPATIENT)
Dept: CARDIOLOGY | Facility: CLINIC | Age: 40
End: 2020-12-21
Payer: COMMERCIAL

## 2020-12-21 VITALS — DIASTOLIC BLOOD PRESSURE: 82 MMHG | SYSTOLIC BLOOD PRESSURE: 110 MMHG

## 2020-12-21 PROCEDURE — 99213 OFFICE O/P EST LOW 20 MIN: CPT

## 2020-12-21 PROCEDURE — 99072 ADDL SUPL MATRL&STAF TM PHE: CPT

## 2020-12-21 NOTE — HISTORY OF PRESENT ILLNESS
[FreeTextEntry1] : 24 hour ambulatory BP monitor showed normal BP and configuration of white oat HTN . BP is high at work however she is stressed at work.

## 2021-01-05 ENCOUNTER — APPOINTMENT (OUTPATIENT)
Dept: SURGERY | Facility: CLINIC | Age: 41
End: 2021-01-05
Payer: COMMERCIAL

## 2021-01-05 ENCOUNTER — NON-APPOINTMENT (OUTPATIENT)
Age: 41
End: 2021-01-05

## 2021-01-05 VITALS
HEIGHT: 62 IN | TEMPERATURE: 97.7 F | BODY MASS INDEX: 34.04 KG/M2 | DIASTOLIC BLOOD PRESSURE: 85 MMHG | WEIGHT: 185 LBS | SYSTOLIC BLOOD PRESSURE: 120 MMHG

## 2021-01-05 DIAGNOSIS — N64.4 MASTODYNIA: ICD-10-CM

## 2021-01-05 DIAGNOSIS — R92.2 INCONCLUSIVE MAMMOGRAM: ICD-10-CM

## 2021-01-05 LAB — CYTOLOGY CVX/VAG DOC THIN PREP: NORMAL

## 2021-01-05 PROCEDURE — 99243 OFF/OP CNSLTJ NEW/EST LOW 30: CPT

## 2021-01-05 PROCEDURE — 99072 ADDL SUPL MATRL&STAF TM PHE: CPT

## 2021-01-05 NOTE — DATA REVIEWED
[FreeTextEntry1] : Patient's recent bilateral screening mammography report was noted.  Heterogeneously dense breast.  BI-RADS Category 1.

## 2021-01-05 NOTE — CONSULT LETTER
[Dear  ___] : Dear  [unfilled], [Consult Letter:] : I had the pleasure of evaluating your patient, [unfilled]. [Please see my note below.] : Please see my note below. [Consult Closing:] : Thank you very much for allowing me to participate in the care of this patient.  If you have any questions, please do not hesitate to contact me. [Sincerely,] : Sincerely, [DrDain  ___] : Dr. CAMPA [FreeTextEntry3] : Mrak Howard MD, FACS\par [facility]\[place] [address]\par

## 2021-01-05 NOTE — PAST MEDICAL HISTORY
[Menstruating] : The patient is menstruating [Menarche Age ____] : age at menarche was [unfilled] [Approximately ___] : the LMP was approximately [unfilled] [Regular Cycle Intervals] : have been regular [Total Preg ___] : G[unfilled] [Live Births ___] : P[unfilled]  [Full Term ___] : Full Term: [unfilled] [Living ___] : Living: [unfilled] [History of Hormone Replacement Treatment] : has no history of hormone replacement treatment

## 2021-01-05 NOTE — PHYSICAL EXAM
[Normocephalic] : normocephalic [Atraumatic] : atraumatic [Supple] : supple [No Supraclavicular Adenopathy] : no supraclavicular adenopathy [No dominant masses] : no dominant masses in right breast  [No dominant masses] : no dominant masses left breast [No Nipple Retraction] : no left nipple retraction [No Nipple Discharge] : no left nipple discharge [Breast Mass Right Breast ___cm] : no masses [Breast Mass Left Breast ___cm] : no masses [Breast Nipple Inversion] : nipples not inverted [Breast Nipple Retraction] : nipples not retracted [Breast Nipple Flattening] : nipples not flattened [Breast Nipple Fissures] : nipples not fissured [Breast Abnormal Lactation (Galactorrhea)] : no galactorrhea [Breast Abnormal Secretion Bloody Fluid] : no bloody discharge [Breast Abnormal Secretion Serous Fluid] : no serous discharge [Breast Abnormal Secretion Opalescent Fluid] : no milky discharge [No Axillary Lymphadenopathy] : no left axillary lymphadenopathy [No Rashes] : no rashes [No Ulceration] : no ulceration

## 2021-01-05 NOTE — HISTORY OF PRESENT ILLNESS
[FreeTextEntry1] : Patient feels a mass in her left breast.  She recently had bilateral screening mammogram also.

## 2021-01-05 NOTE — ASSESSMENT
[FreeTextEntry1] : After examination patient was explained about possible dilated duct.  Patient is menstruating at present.  A prescription for diagnostic sonogram of the left inner lower quadrant was given to the patient to rule out any dilated ducts or any mass effect.  Further treatment based on the diagnostic study.  Local care was explained to the patient in detail.

## 2021-01-08 ENCOUNTER — OUTPATIENT (OUTPATIENT)
Dept: OUTPATIENT SERVICES | Facility: HOSPITAL | Age: 41
LOS: 1 days | Discharge: HOME | End: 2021-01-08
Payer: COMMERCIAL

## 2021-01-08 ENCOUNTER — RESULT REVIEW (OUTPATIENT)
Age: 41
End: 2021-01-08

## 2021-01-08 DIAGNOSIS — R92.2 INCONCLUSIVE MAMMOGRAM: ICD-10-CM

## 2021-01-08 DIAGNOSIS — N63.20 UNSPECIFIED LUMP IN THE LEFT BREAST, UNSPECIFIED QUADRANT: ICD-10-CM

## 2021-01-08 PROCEDURE — 77061 BREAST TOMOSYNTHESIS UNI: CPT | Mod: 26

## 2021-01-08 PROCEDURE — 77065 DX MAMMO INCL CAD UNI: CPT | Mod: 26,LT

## 2021-01-08 PROCEDURE — 76642 ULTRASOUND BREAST LIMITED: CPT | Mod: 26,LT

## 2021-03-19 ENCOUNTER — NON-APPOINTMENT (OUTPATIENT)
Age: 41
End: 2021-03-19

## 2021-04-01 ENCOUNTER — TRANSCRIPTION ENCOUNTER (OUTPATIENT)
Age: 41
End: 2021-04-01

## 2021-04-26 ENCOUNTER — LABORATORY RESULT (OUTPATIENT)
Age: 41
End: 2021-04-26

## 2021-04-28 LAB
ALDOSTERONE SERUM: 5.8 NG/DL
RENIN PLASMA: <2.1 PG/ML

## 2021-05-01 LAB
DOPAMINE UR-MCNC: <30 PG/ML
EPINEPH UR-MCNC: <15 PG/ML
NOREPINEPH UR-MCNC: 201 PG/ML

## 2021-05-05 ENCOUNTER — APPOINTMENT (OUTPATIENT)
Dept: CARDIOLOGY | Facility: CLINIC | Age: 41
End: 2021-05-05
Payer: COMMERCIAL

## 2021-05-05 VITALS — TEMPERATURE: 96.6 F | HEIGHT: 62 IN | WEIGHT: 185 LBS | BODY MASS INDEX: 34.04 KG/M2

## 2021-05-05 VITALS — DIASTOLIC BLOOD PRESSURE: 80 MMHG | SYSTOLIC BLOOD PRESSURE: 120 MMHG

## 2021-05-05 PROCEDURE — 99213 OFFICE O/P EST LOW 20 MIN: CPT

## 2021-05-05 PROCEDURE — 99072 ADDL SUPL MATRL&STAF TM PHE: CPT

## 2021-05-05 RX ORDER — LABETALOL HYDROCHLORIDE 100 MG/1
100 TABLET, FILM COATED ORAL
Qty: 30 | Refills: 6 | Status: DISCONTINUED | COMMUNITY
Start: 2020-05-11 | End: 2021-05-05

## 2021-05-05 NOTE — REASON FOR VISIT
[Consultation] : a consultation regarding [Hypertension] : hypertension [FreeTextEntry3] : Dr Valentino

## 2021-05-05 NOTE — HISTORY OF PRESENT ILLNESS
[FreeTextEntry1] : The patient has borderline BP at work . White coat HTN configuration on 24 hour blood pressure monitor.

## 2021-05-05 NOTE — ASSESSMENT
[FreeTextEntry1] : The patient is thought to have a component of white coat HTN on 24 hour ambulatory BP monitor . May hav a component of labile HTN from stress et al. She is very fatigued however which may be medication related . SHe is over a year post partum . W/U for secondary causes  of HTN was negative .

## 2021-05-09 LAB
METANEPHRINE, PL: 36 PG/ML
NORMETANEPHRINE, PL: 108.6 PG/ML

## 2021-05-15 ENCOUNTER — TRANSCRIPTION ENCOUNTER (OUTPATIENT)
Age: 41
End: 2021-05-15

## 2021-05-25 ENCOUNTER — EMERGENCY (EMERGENCY)
Facility: HOSPITAL | Age: 41
LOS: 0 days | Discharge: HOME | End: 2021-05-25
Attending: EMERGENCY MEDICINE | Admitting: EMERGENCY MEDICINE
Payer: COMMERCIAL

## 2021-05-25 VITALS
OXYGEN SATURATION: 99 % | DIASTOLIC BLOOD PRESSURE: 88 MMHG | TEMPERATURE: 98 F | SYSTOLIC BLOOD PRESSURE: 133 MMHG | WEIGHT: 184.97 LBS | HEIGHT: 61 IN | RESPIRATION RATE: 18 BRPM | HEART RATE: 92 BPM

## 2021-05-25 DIAGNOSIS — Z91.018 ALLERGY TO OTHER FOODS: ICD-10-CM

## 2021-05-25 DIAGNOSIS — I10 ESSENTIAL (PRIMARY) HYPERTENSION: ICD-10-CM

## 2021-05-25 PROCEDURE — 99282 EMERGENCY DEPT VISIT SF MDM: CPT

## 2021-05-25 NOTE — ED PROVIDER NOTE - OBJECTIVE STATEMENT
The pt is a 41y F w/ hx of postpartum preeclampsia ~1 year ago on antihypertensive meds up until 3 weeks ago. She had been tapering over the course of the year until she was down to labetalol 50 mg BID and then stopped 3 weeks ago. Today she was at work and felt jittery so she checked her BP and it was 150/94. Since she had labetalol with her she took 100 mg and was checking her BP. After a couple of hours it did not come down so her manager advised her to come to the ED. In the ED, /88. Pt no longer feels jittery. Says today she had her coffee as usual but nothing out of the ordinary. Denies headache, visual changes, chest pain, SOB, N/V, abdominal pain, diarrhea.

## 2021-05-25 NOTE — ED PROVIDER NOTE - CLINICAL SUMMARY MEDICAL DECISION MAKING FREE TEXT BOX
42 yo woman with transiently elevated BP.  Now asymptomatic and normalized.  Only had "jitteriness" when her BP was elevated earlier.  Stable for discharge and outpatient follow up.

## 2021-05-25 NOTE — ED ADULT NURSE NOTE - NSIMPLEMENTINTERV_GEN_ALL_ED
Implemented All Universal Safety Interventions:  [place] to call system. Call bell, personal items and telephone within reach. Instruct patient to call for assistance. Room bathroom lighting operational. Non-slip footwear when patient is off stretcher. Physically safe environment: no spills, clutter or unnecessary equipment. Stretcher in lowest position, wheels locked, appropriate side rails in place.

## 2021-05-25 NOTE — ED PROVIDER NOTE - ATTENDING CONTRIBUTION TO CARE
I personally evaluated the patient. I reviewed the Resident’s or Physician Assistant’s note (as assigned above), and agree with the findings and plan except as documented in my note.  42 yo woman with transiently elevated BP associated with "jitteriness".  BP was 150/90 by her own BP check.  Otherwise asymptomatic.  On arrival asymptomatic with much improved BP.  Did admittedly take some labetalol she had leftover from previously treated pre-eclampsia.  At this point, well appearing with normalized vital signs.  Stable for discharge.  Outpatient logs of BP in morning and evening.  Outpatient follow up with Dr. contreras to discuss further treatment.  She was encouraged to return only for new symptoms.

## 2021-05-25 NOTE — ED ADULT TRIAGE NOTE - CHIEF COMPLAINT QUOTE
Patient with hx of htn c/o high BP readings at home with dizziness. Patient took meds prior to arrival but still feels dizzy

## 2021-05-25 NOTE — ED PROVIDER NOTE - PHYSICAL EXAMINATION
Vital Signs: I have reviewed the initial vital signs.  Constitutional: well-nourished, appears stated age, no acute distress  Cardiovascular: regular rate, regular rhythm, well-perfused extremities  Respiratory: unlabored respiratory effort, clear to auscultation bilaterally  Gastrointestinal: soft, non-tender abdomen  Musculoskeletal: supple neck, no lower extremity edema  Integumentary: warm, dry, no rash  Neurologic: awake, alert, cranial nerves II-XII grossly intact, extremities’ motor and sensory functions grossly intact  Psychiatric: appropriate mood, appropriate affect

## 2021-05-25 NOTE — ED PROVIDER NOTE - PROGRESS NOTE DETAILS
Discussed BP management with pt, advised her to keep a log with a daily BP until her f/u with Dr. Curtis. Expresses understanding and agrees to discharge.

## 2021-05-25 NOTE — ED PROVIDER NOTE - CARE PROVIDER_API CALL
Berlin Barclay  Cardiology  [address] [place] [address]  Phone: [phone]  Fax: [phone]  Follow Up Time: 1-3 Days

## 2021-05-25 NOTE — ED PROVIDER NOTE - PATIENT PORTAL LINK FT
You can access the FollowMyHealth Patient Portal offered by [facility] by registering at the following website: [email or web address]. By joining [employer]’s FollowMyHealth portal, you will also be able to view your health information using other applications (apps) compatible with our system.

## 2021-05-25 NOTE — ED PROVIDER NOTE - NS ED ROS FT
Constitutional: (-) fever, (+) HTN, (+) jittery feeling  Eyes/ENT: (-) blurry vision, (-) epistaxis  Cardiovascular: (-) chest pain, (-) syncope  Respiratory: (-) cough, (-) shortness of breath  Gastrointestinal: (-) vomiting, (-) diarrhea  Musculoskeletal: (-) neck pain, (-) back pain, (-) joint pain  Integumentary: (-) rash, (-) edema  Neurological: (-) headache, (-) altered mental status  Psychiatric: (-) hallucinations  Allergic/Immunologic: (-) pruritus

## 2021-06-17 ENCOUNTER — APPOINTMENT (OUTPATIENT)
Dept: CARDIOLOGY | Facility: CLINIC | Age: 41
End: 2021-06-17

## 2022-01-13 ENCOUNTER — LABORATORY RESULT (OUTPATIENT)
Age: 42
End: 2022-01-13

## 2022-01-14 ENCOUNTER — APPOINTMENT (OUTPATIENT)
Dept: OBGYN | Facility: CLINIC | Age: 42
End: 2022-01-14
Payer: COMMERCIAL

## 2022-01-14 VITALS
SYSTOLIC BLOOD PRESSURE: 110 MMHG | WEIGHT: 185 LBS | TEMPERATURE: 98 F | BODY MASS INDEX: 34.04 KG/M2 | HEART RATE: 88 BPM | OXYGEN SATURATION: 99 % | DIASTOLIC BLOOD PRESSURE: 74 MMHG | HEIGHT: 62 IN

## 2022-01-14 LAB
BILIRUB UR QL STRIP: NORMAL
CLARITY UR: CLEAR
COLLECTION METHOD: NORMAL
GLUCOSE UR-MCNC: NORMAL
HCG UR QL: 0.2 EU/DL
HGB UR QL STRIP.AUTO: NORMAL
KETONES UR-MCNC: NORMAL
LEUKOCYTE ESTERASE UR QL STRIP: NORMAL
NITRITE UR QL STRIP: NORMAL
PH UR STRIP: 7
PROT UR STRIP-MCNC: NORMAL
SP GR UR STRIP: 1.02

## 2022-01-14 PROCEDURE — 99396 PREV VISIT EST AGE 40-64: CPT

## 2022-01-14 PROCEDURE — 81003 URINALYSIS AUTO W/O SCOPE: CPT | Mod: QW

## 2022-01-14 NOTE — PROCEDURE
[Cervical Pap Smear] : cervical Pap smear [Liquid Base] : liquid base [GC & Chlamydia via Pap] : GC & Chlamydia via Pap [Tolerated Well] : the patient tolerated the procedure well [No Complications] : there were no complications [de-identified] : HPV

## 2022-01-14 NOTE — PLAN
[FreeTextEntry1] : annual exam \par b/l mammo \par TVS -small fibroid 1.9cm \par sti bloodwork/CBC \par vit e/c/d/ folic acid \par rto 6mths/prn

## 2022-01-14 NOTE — HISTORY OF PRESENT ILLNESS
[Patient reported PAP Smear was normal] : Patient reported PAP Smear was normal [Gonorrhea test offered] : Gonorrhea test offered [Chlamydia test offered] : Chlamydia test offered [Trichomonas test offered] : Trichomonas test offered [HPV test offered] : HPV test offered [N] : Patient reports normal menses [Y] : Positive pregnancy history [FreeTextEntry1] : PT PRESENT FOR GYN EXAM.\par \par PT WANTS TO BE BESTED FOR ALL STD\par BLOOD & CULTURES. [TextBox_4] : pt presents for annual reports fiance had lesion on penis and is concerned and doesn’t feel "right" vaginally  [PapSmeardate] : 10/20 [GonorrheaDate] : 10/20 [ChlamydiaDate] : 10/20 [TrichomonasDate] : 10/20 [HPVDate] : 10/20 [PGHxTotal] : 3 [[facility] Hospit[facility]Term] : 3 [[facility] Hospitaliving] : 3

## 2022-01-24 ENCOUNTER — LABORATORY RESULT (OUTPATIENT)
Age: 42
End: 2022-01-24

## 2022-03-23 ENCOUNTER — APPOINTMENT (OUTPATIENT)
Dept: CARDIOLOGY | Facility: CLINIC | Age: 42
End: 2022-03-23
Payer: COMMERCIAL

## 2022-03-23 VITALS
HEART RATE: 76 BPM | HEIGHT: 62 IN | SYSTOLIC BLOOD PRESSURE: 126 MMHG | WEIGHT: 188 LBS | BODY MASS INDEX: 34.6 KG/M2 | DIASTOLIC BLOOD PRESSURE: 80 MMHG

## 2022-03-23 PROCEDURE — 93000 ELECTROCARDIOGRAM COMPLETE: CPT

## 2022-03-23 PROCEDURE — 99213 OFFICE O/P EST LOW 20 MIN: CPT

## 2022-03-23 NOTE — HISTORY OF PRESENT ILLNESS
[FreeTextEntry1] : The patient had increased BP one month ago at time severely increased .She is not taking any medications over the counter . . She states she is not pregnant . At the time she has increased BP she feels her heart "skipping" .

## 2022-03-27 LAB
ALBUMIN SERPL ELPH-MCNC: 4.5 G/DL
ALP BLD-CCNC: 76 U/L
ALT SERPL-CCNC: 12 U/L
ANION GAP SERPL CALC-SCNC: 13 MMOL/L
AST SERPL-CCNC: 14 U/L
BASOPHILS # BLD AUTO: 0.04 K/UL
BASOPHILS NFR BLD AUTO: 0.9 %
BILIRUB SERPL-MCNC: 0.3 MG/DL
BUN SERPL-MCNC: 14 MG/DL
CALCIUM SERPL-MCNC: 9.1 MG/DL
CHLORIDE SERPL-SCNC: 104 MMOL/L
CHOLEST SERPL-MCNC: 167 MG/DL
CO2 SERPL-SCNC: 23 MMOL/L
CREAT SERPL-MCNC: 0.8 MG/DL
EGFR: 94 ML/MIN/1.73M2
EOSINOPHIL # BLD AUTO: 0.05 K/UL
EOSINOPHIL NFR BLD AUTO: 1.1 %
GLUCOSE SERPL-MCNC: 88 MG/DL
HCT VFR BLD CALC: 37.5 %
HDLC SERPL-MCNC: 41 MG/DL
HGB BLD-MCNC: 11 G/DL
IMM GRANULOCYTES NFR BLD AUTO: 0.2 %
LDLC SERPL CALC-MCNC: 119 MG/DL
LYMPHOCYTES # BLD AUTO: 1.86 K/UL
LYMPHOCYTES NFR BLD AUTO: 40.7 %
MAN DIFF?: NORMAL
MCHC RBC-ENTMCNC: 22.6 PG
MCHC RBC-ENTMCNC: 29.3 G/DL
MCV RBC AUTO: 77.2 FL
MONOCYTES # BLD AUTO: 0.37 K/UL
MONOCYTES NFR BLD AUTO: 8.1 %
NEUTROPHILS # BLD AUTO: 2.24 K/UL
NEUTROPHILS NFR BLD AUTO: 49 %
NONHDLC SERPL-MCNC: 126 MG/DL
PLATELET # BLD AUTO: 318 K/UL
POTASSIUM SERPL-SCNC: 4.5 MMOL/L
PROT SERPL-MCNC: 7.4 G/DL
RBC # BLD: 4.86 M/UL
RBC # FLD: 15.1 %
SODIUM SERPL-SCNC: 140 MMOL/L
T4 FREE SERPL-MCNC: 1.2 NG/DL
TRIGL SERPL-MCNC: 52 MG/DL
TSH SERPL-ACNC: 2.25 UIU/ML
WBC # FLD AUTO: 4.57 K/UL

## 2022-03-28 ENCOUNTER — APPOINTMENT (OUTPATIENT)
Dept: CARDIOLOGY | Facility: CLINIC | Age: 42
End: 2022-03-28
Payer: COMMERCIAL

## 2022-03-28 PROCEDURE — 93306 TTE W/DOPPLER COMPLETE: CPT

## 2022-03-30 LAB
ALDOSTERONE SERUM: 5.2 NG/DL
RENIN PLASMA: 2.4 PG/ML

## 2022-04-01 LAB
DOPAMINE UR-MCNC: <30 PG/ML
EPINEPH UR-MCNC: <15 PG/ML
METANEPHRINE, PL: 12.3 PG/ML
NOREPINEPH UR-MCNC: 155 PG/ML
NORMETANEPHRINE, PL: 55.8 PG/ML

## 2022-04-05 NOTE — ASSESSMENT
[FreeTextEntry1] : The patient has had good control of BP off of Nifedipine . Accceptable and normal BP on Labetalol.  [High Risk Surgery - Intraperitoneal, Intrathoracic or Supringuinal Vascular Procedures] : High Risk Surgery - Intraperitoneal, Intrathoracic or Supringuinal Vascular Procedures - No (0) [Ischemic Heart Disease] : Ischemic Heart Disease - No (0) [Congestive Heart Failure] : Congestive Heart Failure - No (0) [Prior Cerebrovascular Accident or TIA] : Prior Cerebrovascular Accident or TIA - No (0) [Creatinine >= 2mg/dL (1 Point)] : Creatinine >= 2mg/dL - No (0) [Insulin-dependent Diabetic (1 Point)] : Insulin-dependent Diabetic - No (0) [0] : 0 , RCRI Class: I, Risk of Post-Op Cardiac Complications: 3.9%, 95% CI for Risk Estimate: 2.8% - 5.4% [ECG] : ECG

## 2022-05-04 ENCOUNTER — APPOINTMENT (OUTPATIENT)
Dept: CARDIOLOGY | Facility: CLINIC | Age: 42
End: 2022-05-04

## 2022-05-06 ENCOUNTER — OUTPATIENT (OUTPATIENT)
Dept: OUTPATIENT SERVICES | Facility: HOSPITAL | Age: 42
LOS: 1 days | Discharge: HOME | End: 2022-05-06

## 2022-05-06 DIAGNOSIS — Z01.21 ENCOUNTER FOR DENTAL EXAMINATION AND CLEANING WITH ABNORMAL FINDINGS: ICD-10-CM

## 2022-05-12 ENCOUNTER — APPOINTMENT (OUTPATIENT)
Dept: CARDIOLOGY | Facility: CLINIC | Age: 42
End: 2022-05-12
Payer: COMMERCIAL

## 2022-05-12 VITALS — DIASTOLIC BLOOD PRESSURE: 80 MMHG | SYSTOLIC BLOOD PRESSURE: 124 MMHG

## 2022-05-12 VITALS — DIASTOLIC BLOOD PRESSURE: 90 MMHG | SYSTOLIC BLOOD PRESSURE: 138 MMHG

## 2022-05-12 PROCEDURE — 99213 OFFICE O/P EST LOW 20 MIN: CPT

## 2022-05-12 NOTE — CARDIOLOGY SUMMARY
[___] : [unfilled] [de-identified] : 3- NSR Short Pr interval otherwise normal  [de-identified] : 3/28/22 \par EF 69%\par trace MVR, trace TR

## 2022-05-12 NOTE — HISTORY OF PRESENT ILLNESS
[FreeTextEntry1] : The patient has had variable BP' though out the day . The patient had normal catecholamines and renin level. . Since being on medication she has not had the body shakes .

## 2022-05-12 NOTE — ASSESSMENT
[FreeTextEntry1] : The patient has had borderline control of her BP both here and at home . . In the past she has had white coat component and she is losing weight and exercising . The patient has had variable BP readings in the office . Manually there was a BP of 124/80 .  The patient has had a white coat component in the past  /.Her shaking and severe HTN episodes have resolved.

## 2022-05-13 DIAGNOSIS — B00.9 HERPESVIRAL INFECTION, UNSPECIFIED: ICD-10-CM

## 2022-08-12 ENCOUNTER — APPOINTMENT (OUTPATIENT)
Dept: CARDIOLOGY | Facility: CLINIC | Age: 42
End: 2022-08-12

## 2022-09-02 ENCOUNTER — RESULT REVIEW (OUTPATIENT)
Age: 42
End: 2022-09-02

## 2022-09-02 ENCOUNTER — APPOINTMENT (OUTPATIENT)
Dept: OBGYN | Facility: CLINIC | Age: 42
End: 2022-09-02

## 2022-09-02 ENCOUNTER — OUTPATIENT (OUTPATIENT)
Dept: OUTPATIENT SERVICES | Facility: HOSPITAL | Age: 42
LOS: 1 days | Discharge: HOME | End: 2022-09-02

## 2022-09-02 ENCOUNTER — RESULT CHARGE (OUTPATIENT)
Age: 42
End: 2022-09-02

## 2022-09-02 VITALS
OXYGEN SATURATION: 98 % | WEIGHT: 190 LBS | SYSTOLIC BLOOD PRESSURE: 120 MMHG | TEMPERATURE: 98 F | HEIGHT: 62 IN | BODY MASS INDEX: 34.96 KG/M2 | HEART RATE: 80 BPM | DIASTOLIC BLOOD PRESSURE: 80 MMHG

## 2022-09-02 DIAGNOSIS — N60.19 DIFFUSE CYSTIC MASTOPATHY OF UNSPECIFIED BREAST: ICD-10-CM

## 2022-09-02 DIAGNOSIS — N60.12 DIFFUSE CYSTIC MASTOPATHY OF LEFT BREAST: ICD-10-CM

## 2022-09-02 DIAGNOSIS — R92.8 OTHER ABNORMAL AND INCONCLUSIVE FINDINGS ON DIAGNOSTIC IMAGING OF BREAST: ICD-10-CM

## 2022-09-02 LAB
BILIRUB UR QL STRIP: NORMAL
GLUCOSE UR-MCNC: NORMAL
HGB UR QL STRIP.AUTO: NORMAL
KETONES UR-MCNC: NORMAL
LEUKOCYTE ESTERASE UR QL STRIP: NORMAL
NITRITE UR QL STRIP: NORMAL
PROT UR STRIP-MCNC: NORMAL

## 2022-09-02 PROCEDURE — 76642 ULTRASOUND BREAST LIMITED: CPT | Mod: 26,LT

## 2022-09-02 PROCEDURE — 77066 DX MAMMO INCL CAD BI: CPT | Mod: 26

## 2022-09-02 PROCEDURE — G0279: CPT | Mod: 26

## 2022-09-02 PROCEDURE — 99213 OFFICE O/P EST LOW 20 MIN: CPT

## 2022-09-02 NOTE — PHYSICAL EXAM
[Examination Of The Breasts] : a normal appearance [Normal] : normal [No Discharge] : no discharge [___cm] : a ~M [unfilled] ~Ucm superior lateral quadrant mass was palpated [1:00] : in the 1:00 position [No Masses] : no breast masses were palpable

## 2022-09-02 NOTE — HISTORY OF PRESENT ILLNESS
[Y] : Positive pregnancy history [FreeTextEntry1] : pt present for breast pain [Mammogramdate] : 2021 [PapSmeardate] : 1/2022 [BoneDensityDate] : 0 [ColonoscopyDate] : 0 [LMPDate] : 8/16/2022 [PGHxTotal] : 3 [[facility]Term] : 3 [[facility] Centeriving] : 3

## 2022-09-02 NOTE — PLAN
[FreeTextEntry1] : diagnostic bilateral mammogram and breast sonogram ordered and patient sent to Dr Howard for breast evaluation

## 2022-09-21 NOTE — REASON FOR VISIT
[facility] Scheduling [phone]   [facility] [phone]   [Initial Evaluation] : an initial evaluation [FreeTextEntry1] : Palpable mass left breast

## 2022-11-14 ENCOUNTER — OUTPATIENT (OUTPATIENT)
Dept: OUTPATIENT SERVICES | Facility: HOSPITAL | Age: 42
LOS: 1 days | Discharge: HOME | End: 2022-11-14

## 2022-11-14 DIAGNOSIS — K06.1 GINGIVAL ENLARGEMENT: ICD-10-CM

## 2023-02-15 ENCOUNTER — EMERGENCY (EMERGENCY)
Facility: HOSPITAL | Age: 43
LOS: 0 days | Discharge: HOME | End: 2023-02-15
Attending: EMERGENCY MEDICINE
Payer: COMMERCIAL

## 2023-02-15 ENCOUNTER — APPOINTMENT (OUTPATIENT)
Dept: OPHTHALMOLOGY | Facility: CLINIC | Age: 43
End: 2023-02-15
Payer: COMMERCIAL

## 2023-02-15 ENCOUNTER — OUTPATIENT (OUTPATIENT)
Dept: OUTPATIENT SERVICES | Facility: HOSPITAL | Age: 43
LOS: 1 days | End: 2023-02-15
Payer: COMMERCIAL

## 2023-02-15 VITALS
SYSTOLIC BLOOD PRESSURE: 159 MMHG | TEMPERATURE: 98 F | DIASTOLIC BLOOD PRESSURE: 81 MMHG | HEART RATE: 81 BPM | RESPIRATION RATE: 16 BRPM | OXYGEN SATURATION: 98 %

## 2023-02-15 DIAGNOSIS — B30.9 VIRAL CONJUNCTIVITIS, UNSPECIFIED: ICD-10-CM
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
## 2023-02-15 DIAGNOSIS — S05.00XD: ICD-10-CM

## 2023-02-15 DIAGNOSIS — H00.033 ABSCESS OF EYELID RIGHT EYE, UNSPECIFIED EYELID: ICD-10-CM

## 2023-02-15 DIAGNOSIS — Z91.018 ALLERGY TO OTHER FOODS: ICD-10-CM

## 2023-02-15 DIAGNOSIS — I10 ESSENTIAL (PRIMARY) HYPERTENSION: ICD-10-CM

## 2023-02-15 DIAGNOSIS — H10.9 UNSPECIFIED CONJUNCTIVITIS: ICD-10-CM

## 2023-02-15 DIAGNOSIS — S05.01XA INJURY OF CONJUNCTIVA AND CORNEAL ABRASION WITHOUT FOREIGN BODY, RIGHT EYE, INITIAL ENCOUNTER: ICD-10-CM

## 2023-02-15 DIAGNOSIS — H57.89 OTHER SPECIFIED DISORDERS OF EYE AND ADNEXA: ICD-10-CM

## 2023-02-15 PROCEDURE — 99283 EMERGENCY DEPT VISIT LOW MDM: CPT

## 2023-02-15 PROCEDURE — 99284 EMERGENCY DEPT VISIT MOD MDM: CPT

## 2023-02-15 PROCEDURE — 92002 INTRM OPH EXAM NEW PATIENT: CPT

## 2023-02-15 NOTE — ED PROVIDER NOTE - CARE PLAN
1 Principal Discharge DX:	Preseptal cellulitis of right eye  Secondary Diagnosis:	Corneal abrasion  Secondary Diagnosis:	Conjunctivitis

## 2023-02-15 NOTE — ED PROVIDER NOTE - PATIENT PORTAL LINK FT
You can access the FollowMyHealth Patient Portal offered by [facility] by registering at the following website: [email or web address]. By joining [employer]’s FollowMyHealth portal, you will also be able to view your health information using other applications (apps) compatible with our system.

## 2023-02-15 NOTE — ED PROVIDER NOTE - OBJECTIVE STATEMENT
43-year-old female with history of hypertension presents to the ED complaining of something flew in her right eye on Sunday.  Patient states attempted multiple times to remove it and finally flushed it out.  Patient was seen at urgent care on Monday and started on eyedrops.  Patient followed up with ophthalmology on Monday and started on clindamycin.  Patient continues to have swelling to the eye.  No fever, chills or weakness.

## 2023-02-15 NOTE — ED PROVIDER NOTE - CARE PROVIDER_API CALL
Vernon Sidhu)  Ophthalmology  [address] [place] [address]  Phone: [phone]  Fax: [phone]  Scheduled Appointment: 02/15/2023 12:30 PM

## 2023-02-15 NOTE — ED PROVIDER NOTE - ATTENDING APP SHARED VISIT CONTRIBUTION OF CARE
43-year-old female past medical history of hypertension presents with right eye redness and discomfort.  States Sunday she felt something fly and when she lifted a blanket.  Was rubbing it/did at home and got the foreign body out.  Over the next few days developed redness and tearing of the eye.  No blurry vision or change in vision.  No eye pain or pain with eye movements.  No fever.  And now developed eyelid redness and swelling similar to prior preseptal cellulitis she has had in the past.  Patient seen at urgent care who stained eye and gave antibiotic eyedrops.  We also started her on oral clindamycin on Monday.  Patient seen by ophthalmologist on Monday as well.  Came to ED today because the eyelid swelling is not improving.    On exam, AFVSS, Well appearing, No acute distress, NCAT, EOMI, PERRLA, MMM, Neck supple, right eye conjunctival injection, no discharge, no foreign bodies, +fluorescein testing mild uptake medial cornea, eyelid edema erythema, no pain with eye movements, no proptosis, no photophobia, AAOx3, No Focal Deficits, No LE edema or calf TTP,    A/P; preseptal cellulitis, conjunctivitis, continue antibiotic eyedrops, will change oral antibiotics, ophthalmology contacted and states they will see patient today in clinic at 12:30 PM.  Patient in agreement with plan

## 2023-02-23 ENCOUNTER — EMERGENCY (EMERGENCY)
Facility: HOSPITAL | Age: 43
LOS: 0 days | Discharge: HOME | End: 2023-02-23
Attending: EMERGENCY MEDICINE
Payer: COMMERCIAL

## 2023-02-23 VITALS
DIASTOLIC BLOOD PRESSURE: 75 MMHG | SYSTOLIC BLOOD PRESSURE: 135 MMHG | RESPIRATION RATE: 18 BRPM | OXYGEN SATURATION: 99 % | WEIGHT: 184.97 LBS | HEIGHT: 62 IN | HEART RATE: 91 BPM | TEMPERATURE: 98 F

## 2023-02-23 DIAGNOSIS — I10 ESSENTIAL (PRIMARY) HYPERTENSION: ICD-10-CM

## 2023-02-23 DIAGNOSIS — Z91.018 ALLERGY TO OTHER FOODS: ICD-10-CM

## 2023-02-23 DIAGNOSIS — H57.89 OTHER SPECIFIED DISORDERS OF EYE AND ADNEXA: ICD-10-CM

## 2023-02-23 PROCEDURE — 99284 EMERGENCY DEPT VISIT MOD MDM: CPT

## 2023-02-23 PROCEDURE — 99283 EMERGENCY DEPT VISIT LOW MDM: CPT

## 2023-02-23 NOTE — ED PROVIDER NOTE - CLINICAL SUMMARY MEDICAL DECISION MAKING FREE TEXT BOX
43-year-old female PMH HTN presenting for evaluation of recurrent right eye redness.  Patient originally developed symptoms about a week ago, was seen by Dr. Sidhu,, diagnosed with preseptal cellulitis, started on oral antibiotics which she is still taking as well as polytrim ophthalmic drops.  She reported some improvement in symptoms until yesterday when redness of her conjunctiva returned.  She reports mild tearing, but denies any eye pain, purulent discharge, headache or any other additional acute complaints.  Her next ophthalmology appointment is in 2 weeks.  Young female does not appear to be in any acute distress, there is mild conjunctival injection, minimal edema of the upper and lower eyelids without cellulitis, PERRLA no proptosis, painless extraocular nml visual acuity and eye pressure as documented in the PA's note. SUNG Ghosh discussed the case with ophthalmology clinic, patient has an appointment for tomorrow morning, advised to apply cool compresses to the affected  eye.  Patient verbalized understanding is amenable to plan.

## 2023-02-23 NOTE — ED PROVIDER NOTE - NSFOLLOWUPINSTRUCTIONS_ED_ALL_ED_FT
Conjunctivitis    WHAT YOU NEED TO KNOW:    Conjunctivitis, or pink eye, is inflammation of your conjunctiva. The conjunctiva is a thin tissue that covers the front of your eye and the back of your eyelids. The conjunctiva helps protect your eye and keep it moist. Conjunctivitis may be caused by bacteria, allergies, or a virus. If your conjunctivitis is caused by bacteria, it may get better on its own in about 7 days. Viral conjunctivitis can last up to 3 weeks. Conjunctivitis         DISCHARGE INSTRUCTIONS:    Return to the emergency department if:     You have worsening eye pain.       The swelling in your eye gets worse, even after treatment.       Your vision suddenly becomes worse or you cannot see at all.    Contact your healthcare provider if:     You develop a fever and ear pain.      You have tiny bumps or spots of blood on your eye.      You have questions or concerns about your condition or care.    Manage your symptoms:     Apply a cool compress. Wet a washcloth with cold water and place it on your eye. This will help decrease itching and irritation.      Do not wear contact lenses. They can irritate your eye. Throw away the pair you are using and ask when you can wear them again. Use a new pair of lenses when your healthcare provider says it is okay.       Avoid irritants. Stay away from smoke filled areas. Shield your eyes from wind and sun.       Flush your eye. You may need to flush your eye with saline to help decrease your symptoms. Ask for more information on how to flush your eye.     Medicines: Treatment depends on what is causing your conjunctivitis. You maybe given any of the following:    Allergy medicine helps decrease itchy, red, swollen eyes caused by allergies. It may be given as a pill, eye drops, or nasal spray.      Antibiotics may be needed if your conjunctivitis is caused by bacteria. This medicine may be given as a pill, eye drops, or eye ointment.      Take your medicine as directed. Contact your healthcare provider if you think your medicine is not helping or if you have side effects. Tell him or her if you are allergic to any medicine. Keep a list of the medicines, vitamins, and herbs you take. Include the amounts, and when and why you take them. Bring the list or the pill bottles to follow-up visits. Carry your medicine list with you in case of an emergency.    Prevent the spread of conjunctivitis:     Wash your hands with soap and water often. Wash your hands before and after you touch your eyes. Also wash your hands before you prepare or eat food and after you use the bathroom or change a diaper.      Avoid allergens. Try to avoid the things that cause your allergies, such as pets, dust, or grass.       Avoid contact with others. Do not share towels or washcloths. Try to stay away from others as much as possible. Ask when you can return to work or school.       Throw away eye makeup. The bacteria that caused your conjunctivitis can stay in eye makeup. Throw away mascara and other eye makeup.         © Copyright [employer] 2019 All illustrations and images included in CareNotes are the copyrighted property of [employer] or [employer].

## 2023-02-23 NOTE — ED PROVIDER NOTE - PHYSICAL EXAMINATION
Physical Exam    Vital Signs: I have reviewed the initial vital signs.  Constitutional: well-nourished, appears stated age, no acute distress  Eyes: Conjunctiva erythema of the right eye, Sclera clear, PERRLA, EOMI without pain. negative hyphema. negative subconjunctival hemorrhage. visual fields intact. visual acuity is: 20/15 b/l  fluoroscein stain of the right eye is negative for corneal abrasion, and negative sidel sign. no visible foreign body of the right eye. tonometry is 14 mmhg in the right eye and 15 mmhg int he left eye. no swelling of the eyelids b/l. no tenderness surrounding the orbits b/l.   Cardiovascular: well-perfused extremities, radial pulses equal and 2+ b/l.   Respiratory: unlabored respiratory effort, pt is speaking full sentences. no accessory muscle use.   Musculoskeletal: FROM of b/l upper and lower extremities.   Integumentary: warm, dry, no rash  Neurologic: awake, alert, extremities’ motor and sensory functions grossly intact.   Psychiatric: appropriate mood, appropriate affect

## 2023-02-23 NOTE — ED PROVIDER NOTE - PROGRESS NOTE DETAILS
FF: I spoke with optho who reports to have pt continue oral abx, use cold compresses, and artificial tears. pt can be seen in the optho clinic tomorrow at 9:45AM. pt advised of return precautions discussed at bedside. agreeable to dc.

## 2023-02-23 NOTE — ED PROVIDER NOTE - OBJECTIVE STATEMENT
44 y/o female with a PMH of HTN presents to the ED for evaluation of right eye redness that began yesterday. pt reports on 02/12 she was diagnosed with conjunctivitis and given polytrim and followed up with optho shortly after. pt then reports 02/15 she developed redness and swelling around the right eye and was diagnosed with pre septal cellulitis and started on augmentin and bactrim which she is almost finished taking. pt was seen by Dr. Sidhu on wednesday and pre septal cellulitis ws confirmed. pt reports her eye was improving but then yesterday developed eye redness again. pt reports wearing glasses for the computer screen. pt reports clear drainage from the right eye. pt denies pain with bright light, use of contacts, recent trauma, visual changes, eye pain, hx of autoimmune disease, sore throat, runny nose, or headaches.

## 2023-02-23 NOTE — ED PROVIDER NOTE - PATIENT PORTAL LINK FT
You can access the FollowMyHealth Patient Portal offered by [facility] by registering at the following website: [email or web address]. By joining [email or web address]’s FollowMyHealth portal, you will also be able to view your health information using other applications (apps) compatible with our system.

## 2023-02-23 NOTE — ED PROVIDER NOTE - NSFOLLOWUPCLINICS_GEN_ALL_ED_FT
[facility]  Ophthalmolgy  [address], [address]  [place] [address]  Phone: [phone]  Fax:   Scheduled Appointment: 2/24/2023 9:45 AM

## 2023-02-24 ENCOUNTER — OUTPATIENT (OUTPATIENT)
Dept: OUTPATIENT SERVICES | Facility: HOSPITAL | Age: 43
LOS: 1 days | End: 2023-02-24
Payer: COMMERCIAL

## 2023-02-24 ENCOUNTER — APPOINTMENT (OUTPATIENT)
Dept: OPHTHALMOLOGY | Facility: CLINIC | Age: 43
End: 2023-02-24

## 2023-02-24 DIAGNOSIS — K02.52 DENTAL CARIES ON PIT AND FISSURE SURFACE PENETRATING INTO DENTIN: ICD-10-CM

## 2023-02-24 DIAGNOSIS — H53.8 OTHER VISUAL DISTURBANCES: ICD-10-CM

## 2023-02-24 PROCEDURE — 92012 INTRM OPH EXAM EST PATIENT: CPT

## 2023-02-24 PROCEDURE — D2150: CPT

## 2023-02-28 DIAGNOSIS — K02.62 DENTAL CARIES ON SMOOTH SURFACE PENETRATING INTO DENTIN: ICD-10-CM

## 2023-03-01 ENCOUNTER — APPOINTMENT (OUTPATIENT)
Dept: GASTROENTEROLOGY | Facility: CLINIC | Age: 43
End: 2023-03-01

## 2023-03-01 ENCOUNTER — APPOINTMENT (OUTPATIENT)
Dept: GASTROENTEROLOGY | Facility: CLINIC | Age: 43
End: 2023-03-01
Payer: COMMERCIAL

## 2023-03-01 ENCOUNTER — OUTPATIENT (OUTPATIENT)
Dept: OUTPATIENT SERVICES | Facility: HOSPITAL | Age: 43
LOS: 1 days | End: 2023-03-01
Payer: COMMERCIAL

## 2023-03-01 VITALS
SYSTOLIC BLOOD PRESSURE: 133 MMHG | OXYGEN SATURATION: 98 % | TEMPERATURE: 97.5 F | WEIGHT: 203 LBS | BODY MASS INDEX: 37.36 KG/M2 | HEART RATE: 71 BPM | DIASTOLIC BLOOD PRESSURE: 85 MMHG | HEIGHT: 62 IN

## 2023-03-01 DIAGNOSIS — K58.9 IRRITABLE BOWEL SYNDROME W/OUT DIARRHEA: ICD-10-CM

## 2023-03-01 DIAGNOSIS — Z83.79 FAMILY HISTORY OF OTHER DISEASES OF THE DIGESTIVE SYSTEM: ICD-10-CM

## 2023-03-01 DIAGNOSIS — Z00.00 ENCOUNTER FOR GENERAL ADULT MEDICAL EXAMINATION WITHOUT ABNORMAL FINDINGS: ICD-10-CM

## 2023-03-01 DIAGNOSIS — Z78.9 OTHER SPECIFIED HEALTH STATUS: ICD-10-CM

## 2023-03-01 DIAGNOSIS — K58.9 IRRITABLE BOWEL SYNDROME WITHOUT DIARRHEA: ICD-10-CM

## 2023-03-01 DIAGNOSIS — D64.9 ANEMIA, UNSPECIFIED: ICD-10-CM

## 2023-03-01 DIAGNOSIS — K21.9 GASTRO-ESOPHAGEAL REFLUX DISEASE WITHOUT ESOPHAGITIS: ICD-10-CM

## 2023-03-01 PROCEDURE — 99204 OFFICE O/P NEW MOD 45 MIN: CPT | Mod: GC

## 2023-03-01 PROCEDURE — 99204 OFFICE O/P NEW MOD 45 MIN: CPT

## 2023-03-01 RX ORDER — VALACYCLOVIR 500 MG/1
500 TABLET, FILM COATED ORAL TWICE DAILY
Qty: 14 | Refills: 1 | Status: DISCONTINUED | COMMUNITY
Start: 2022-05-13 | End: 2023-03-01

## 2023-03-01 NOTE — PHYSICAL EXAM
[Alert] : alert [Normal Voice/Communication] : normal voice/communication [Sclera] : the sclera and conjunctiva were normal [Hearing Threshold Finger Rub Not [place]] : hearing was normal [Normal Appearance] : the appearance of the neck was normal [Auscultation Breath Sounds / Voice Sounds] : lungs were clear to auscultation bilaterally [Normal S1, S2] : normal S1 and S2 [Abdomen Tenderness] : non-tender [Abdomen Soft] : soft [Abnormal Walk] : normal gait [Oriented To Time, Place, And Person] : oriented to person, place, and time

## 2023-03-02 NOTE — REVIEW OF SYSTEMS
[As Noted in HPI] : as noted in HPI [Fever] : no fever [Chills] : no chills [Red Eyes] : eyes not red [Sore Throat] : no sore throat [Chest Pain] : no chest pain [SOB on Exertion] : no shortness of breath during exertion

## 2023-03-02 NOTE — ASSESSMENT
[FreeTextEntry1] : a 42 yo lady with HTN, obesity, BMI 37 is here for gassy abdominal discomfort\par \par *Microcytic anemia\par no iron panel on chart\par no clinical signs of GI bleeding \par has heavy period\par \par *Gassy discomfort\par *Epigastric pain\par *GERD\par \par rec\par -EGD and colonoscopy\par -counseled about the procedures, risks and benefits\par -counseled about the prep \par -repeat CBC, iron panel\par -continue gasX\par -low fodmap diet, will send to dietician since she has a lot of allergy and will be hard to follow specific diet with her list of allergy\par \par

## 2023-03-02 NOTE — HISTORY OF PRESENT ILLNESS
[FreeTextEntry1] : a 42 yo lady with HTN, obesity, BMI 37 is here for gassy abdominal discomfort\par it has been going on for 3 months, occurs daily, did not notice any correlation to specific food, she tried gasX which helped significantly\par 1-2 BM per day, non bloody, non mucoid \par also endorses epigastric pain, once a month, sharp like, radiating to RUQ (feels like food is blocked)\par \par no nausea no vomiting no dysphagia no odynophagia no melena no hematochezia\par had EGD 13 years ago for stomach issues, [place] the city [place] not [place] the report \par reports having sludge in GB on previous US\par \par labs reviewed \par hb 11 in 2022 with low MCV\par normal LFts\par \par US 2020 no gallstones or sludge\par \par social neg x3\par \par family hx with Grandparent with UC

## 2023-03-10 ENCOUNTER — OUTPATIENT (OUTPATIENT)
Dept: OUTPATIENT SERVICES | Facility: HOSPITAL | Age: 43
LOS: 1 days | End: 2023-03-10
Payer: COMMERCIAL

## 2023-03-10 ENCOUNTER — APPOINTMENT (OUTPATIENT)
Dept: OPHTHALMOLOGY | Facility: CLINIC | Age: 43
End: 2023-03-10
Payer: COMMERCIAL

## 2023-03-10 DIAGNOSIS — L03.213 PERIORBITAL CELLULITIS: ICD-10-CM

## 2023-03-10 DIAGNOSIS — H10.13 ACUTE ATOPIC CONJUNCTIVITIS, BILATERAL: ICD-10-CM

## 2023-03-10 DIAGNOSIS — H53.8 OTHER VISUAL DISTURBANCES: ICD-10-CM

## 2023-03-10 PROCEDURE — 92012 INTRM OPH EXAM EST PATIENT: CPT

## 2023-03-17 ENCOUNTER — APPOINTMENT (OUTPATIENT)
Dept: OBGYN | Facility: CLINIC | Age: 43
End: 2023-03-17
Payer: COMMERCIAL

## 2023-03-17 VITALS
BODY MASS INDEX: 36.8 KG/M2 | OXYGEN SATURATION: 97 % | WEIGHT: 200 LBS | HEIGHT: 62 IN | HEART RATE: 91 BPM | TEMPERATURE: 98.3 F | DIASTOLIC BLOOD PRESSURE: 80 MMHG | SYSTOLIC BLOOD PRESSURE: 132 MMHG

## 2023-03-17 DIAGNOSIS — B00.9 HERPESVIRAL INFECTION, UNSPECIFIED: ICD-10-CM

## 2023-03-17 DIAGNOSIS — N92.0 EXCESSIVE AND FREQUENT MENSTRUATION WITH REGULAR CYCLE: ICD-10-CM

## 2023-03-17 LAB
BILIRUB UR QL STRIP: NEGATIVE
CLARITY UR: CLEAR
COLLECTION METHOD: NORMAL
GLUCOSE UR-MCNC: NEGATIVE
HCG UR QL: 0.2 EU/DL
HGB UR QL STRIP.AUTO: NORMAL
KETONES UR-MCNC: NEGATIVE
LEUKOCYTE ESTERASE UR QL STRIP: NEGATIVE
NITRITE UR QL STRIP: NEGATIVE
PH UR STRIP: 6.5
PROT UR STRIP-MCNC: NEGATIVE
SP GR UR STRIP: 1.02

## 2023-03-17 PROCEDURE — 99396 PREV VISIT EST AGE 40-64: CPT

## 2023-03-17 PROCEDURE — 81003 URINALYSIS AUTO W/O SCOPE: CPT | Mod: QW

## 2023-03-17 NOTE — PROCEDURE
[Cervical Pap Smear] : cervical Pap smear [Liquid Base] : liquid base [GC & Chlamydia via Pap] : GC & Chlamydia via Pap [Tolerated Well] : the patient tolerated the procedure well [No Complications] : there were no complications [de-identified] : HPV

## 2023-03-17 NOTE — HISTORY OF PRESENT ILLNESS
[Gonorrhea test offered] : Gonorrhea test offered [Chlamydia test offered] : Chlamydia test offered [Trichomonas test offered] : Trichomonas test offered [HPV test offered] : HPV test offered [Yes] : pregnancy [TextBox_4] : pt presents for annual \par pt c/o heavy cycles- interested in BCPs  \par hx HSV [Mammogramdate] : 9/2/22 [BreastSonogramDate] : 9/2/22 [PapSmeardate] : 1/144/22 [BoneDensityDate] : -0- [ColonoscopyDate] : -0- [LMPDate] : 3/9/23 [MensesFreq] : 28 [MensesLength] : 5 [PGHxTotal] : 3 [[facility]] : 3 [TextBox_6] : 3/9/23 [TextBox_9] : 13 [TextBox_13] : 28 [TextBox_15] : 5 [FreeTextEntry1] : 3/9/23

## 2023-03-17 NOTE — DISCUSSION/SUMMARY
[FreeTextEntry1] : annual with pap/cx/hpv \par TVS \par pt with current B/L mammo \par pt will try micronor x 3mths \par instructed on use/ r/b/a\par will also give rx for valtrex\par diet/exercise/decrease weight \par vit e/c/d folic acid/ lysine/fish oil /increase po fluids \par rto 3mths/prn

## 2023-03-17 NOTE — PLAN
[FreeTextEntry1] : pt presents for annual and c/o menorrhagia \par pt also reports HSV outbreaks more frequent\par

## 2023-03-17 NOTE — PHYSICAL EXAM
[Appropriately responsive] : appropriately responsive [Alert] : alert [No Acute Distress] : no acute distress [Soft] : soft [Non-tender] : non-tender [Oriented x3] : oriented x3 [Examination Of The Breasts] : a normal appearance [No Masses] : no breast masses were palpable [Labia Majora] : normal [Labia Minora] : normal [Normal] : normal [Enlarged ___ wks] : enlarged [unfilled] ~Uweeks [Uterine Adnexae] : normal

## 2023-03-21 LAB
C TRACH RRNA SPEC QL NAA+PROBE: NOT DETECTED
CYTOLOGY CVX/VAG DOC THIN PREP: NORMAL
HPV HIGH+LOW RISK DNA PNL CVX: NOT DETECTED
N GONORRHOEA RRNA SPEC QL NAA+PROBE: NOT DETECTED
SOURCE AMPLIFICATION: NORMAL
SOURCE AMPLIFICATION: NORMAL
T VAGINALIS RRNA SPEC QL NAA+PROBE: NOT DETECTED

## 2023-03-23 DIAGNOSIS — H10.13 ACUTE ATOPIC CONJUNCTIVITIS, BILATERAL: ICD-10-CM

## 2023-05-28 NOTE — ED ADULT NURSE NOTE - NSIMPLEMENTINTERV_GEN_ALL_ED
Implemented All Universal Safety Interventions:  [place] to call system. Call bell, personal items and telephone within reach. Instruct patient to call for assistance. Room bathroom lighting operational. Non-slip footwear when patient is off stretcher. Physically safe environment: no spills, clutter or unnecessary equipment. Stretcher in lowest position, wheels locked, appropriate side rails in place.
Yes

## 2023-06-14 ENCOUNTER — APPOINTMENT (OUTPATIENT)
Dept: OBGYN | Facility: CLINIC | Age: 43
End: 2023-06-14

## 2023-06-30 ENCOUNTER — APPOINTMENT (OUTPATIENT)
Dept: CARDIOLOGY | Facility: CLINIC | Age: 43
End: 2023-06-30
Payer: COMMERCIAL

## 2023-06-30 VITALS — HEIGHT: 62 IN | BODY MASS INDEX: 36.62 KG/M2 | TEMPERATURE: 97.8 F | WEIGHT: 199 LBS

## 2023-06-30 VITALS — DIASTOLIC BLOOD PRESSURE: 74 MMHG | SYSTOLIC BLOOD PRESSURE: 124 MMHG

## 2023-06-30 DIAGNOSIS — R51.9 HEADACHE, UNSPECIFIED: ICD-10-CM

## 2023-06-30 PROCEDURE — 99214 OFFICE O/P EST MOD 30 MIN: CPT

## 2023-06-30 RX ORDER — VALACYCLOVIR 500 MG/1
500 TABLET, FILM COATED ORAL TWICE DAILY
Qty: 10 | Refills: 1 | Status: COMPLETED | COMMUNITY
Start: 2023-03-17 | End: 2023-06-30

## 2023-06-30 RX ORDER — LABETALOL HYDROCHLORIDE 100 MG/1
100 TABLET, FILM COATED ORAL
Qty: 60 | Refills: 3 | Status: COMPLETED | COMMUNITY
End: 2023-06-30

## 2023-06-30 RX ORDER — NORETHINDRONE 0.35 MG/1
0.35 TABLET ORAL DAILY
Qty: 1 | Refills: 2 | Status: COMPLETED | COMMUNITY
Start: 2023-03-17 | End: 2023-06-30

## 2023-06-30 RX ORDER — POLYETHYLENE GLYCOL 3350 AND ELECTROLYTES WITH LEMON FLAVOR 236; 22.74; 6.74; 5.86; 2.97 G/4L; G/4L; G/4L; G/4L; G/4L
236 POWDER, FOR SOLUTION ORAL
Qty: 1 | Refills: 0 | Status: COMPLETED | COMMUNITY
Start: 2023-03-01 | End: 2023-06-30

## 2023-06-30 NOTE — HISTORY OF PRESENT ILLNESS
[FreeTextEntry1] : The patient for the most part has had good BP's  She was taking Labetaolol but not on a regular basis only when she noted her BP to be high . No SOB or chest pain .

## 2023-06-30 NOTE — ASSESSMENT
[FreeTextEntry1] : The patient has had better control of her BP . These episodes of HTN appear to be episodic and has been related to Covid . BP is well controlled in the office today . The patient ha not had SOB or CP . She is currently no on Labetaolol . Whe she takes the Labetaolol during these episodes her bp comes down to normal .

## 2023-06-30 NOTE — CARDIOLOGY SUMMARY
[___] : [unfilled] [de-identified] : 3- NSR Short Pr interval otherwise normal  [de-identified] : 3/28/22 \par EF 69%\par trace MVR, trace TR

## 2023-08-18 NOTE — ED ADULT TRIAGE NOTE - NS ED TRIAGE AVPU SCALE
Faxed again today. Alert-The patient is alert, awake and responds to voice. The patient is oriented to time, place, and person. The triage nurse is able to obtain subjective information.

## 2023-09-15 ENCOUNTER — APPOINTMENT (OUTPATIENT)
Dept: OPHTHALMOLOGY | Facility: CLINIC | Age: 43
End: 2023-09-15

## 2023-10-05 ENCOUNTER — APPOINTMENT (OUTPATIENT)
Dept: CARDIOLOGY | Facility: CLINIC | Age: 43
End: 2023-10-05
Payer: COMMERCIAL

## 2023-10-05 VITALS
HEIGHT: 62 IN | SYSTOLIC BLOOD PRESSURE: 166 MMHG | HEART RATE: 102 BPM | DIASTOLIC BLOOD PRESSURE: 86 MMHG | BODY MASS INDEX: 36.99 KG/M2 | WEIGHT: 201 LBS

## 2023-10-05 DIAGNOSIS — Z01.419 ENCOUNTER FOR GYNECOLOGICAL EXAMINATION (GENERAL) (ROUTINE) W/OUT ABNORMAL FINDINGS: ICD-10-CM

## 2023-10-05 DIAGNOSIS — R05.9 COUGH, UNSPECIFIED: ICD-10-CM

## 2023-10-05 PROCEDURE — 93306 TTE W/DOPPLER COMPLETE: CPT

## 2023-10-05 PROCEDURE — 93000 ELECTROCARDIOGRAM COMPLETE: CPT

## 2023-10-05 PROCEDURE — 99214 OFFICE O/P EST MOD 30 MIN: CPT

## 2023-10-07 LAB
ALBUMIN SERPL ELPH-MCNC: 4.3 G/DL
ALP BLD-CCNC: 82 U/L
ALT SERPL-CCNC: 10 U/L
ANION GAP SERPL CALC-SCNC: 11 MMOL/L
AST SERPL-CCNC: 12 U/L
BILIRUB SERPL-MCNC: 0.3 MG/DL
BUN SERPL-MCNC: 16 MG/DL
CALCIUM SERPL-MCNC: 8.7 MG/DL
CHLORIDE SERPL-SCNC: 103 MMOL/L
CHOLEST SERPL-MCNC: 185 MG/DL
CO2 SERPL-SCNC: 25 MMOL/L
CREAT SERPL-MCNC: 0.7 MG/DL
DEPRECATED D DIMER PPP IA-ACNC: 152 NG/ML DDU
EGFR: 110 ML/MIN/1.73M2
GLUCOSE SERPL-MCNC: 97 MG/DL
HDLC SERPL-MCNC: 47 MG/DL
LDLC SERPL CALC-MCNC: 121 MG/DL
NONHDLC SERPL-MCNC: 138 MG/DL
POTASSIUM SERPL-SCNC: 4.3 MMOL/L
PROT SERPL-MCNC: 7.1 G/DL
SODIUM SERPL-SCNC: 139 MMOL/L
T4 FREE SERPL-MCNC: 1.3 NG/DL
TRIGL SERPL-MCNC: 83 MG/DL
TSH SERPL-ACNC: 3.85 UIU/ML

## 2023-10-15 LAB
ALDOSTERONE SERUM: 10.6 NG/DL
METANEPHRINE, PL: 17.3 PG/ML
NORMETANEPHRINE, PL: 82.9 PG/ML
RENIN PLASMA: 3 PG/ML

## 2023-10-19 ENCOUNTER — APPOINTMENT (OUTPATIENT)
Dept: CARDIOLOGY | Facility: CLINIC | Age: 43
End: 2023-10-19
Payer: COMMERCIAL

## 2023-10-19 VITALS
DIASTOLIC BLOOD PRESSURE: 80 MMHG | BODY MASS INDEX: 37.17 KG/M2 | WEIGHT: 202 LBS | SYSTOLIC BLOOD PRESSURE: 138 MMHG | HEIGHT: 62 IN

## 2023-10-19 DIAGNOSIS — D64.9 ANEMIA, UNSPECIFIED: ICD-10-CM

## 2023-10-19 DIAGNOSIS — K21.9 GASTRO-ESOPHAGEAL REFLUX DISEASE W/OUT ESOPHAGITIS: ICD-10-CM

## 2023-10-19 PROCEDURE — 99213 OFFICE O/P EST LOW 20 MIN: CPT

## 2023-10-22 LAB
DOPAMINE UR-MCNC: <30 PG/ML
EPINEPH UR-MCNC: <15 PG/ML
NOREPINEPH UR-MCNC: 232 PG/ML

## 2023-11-06 NOTE — ED ADULT NURSE NOTE - NS ED PATIENT SAFETY CONCERN
----- Message from Shelby Rodriguez MA sent at 10/10/2023  4:16 PM CDT -----   - needs Gastric Emptying Study (at [facility])   - manometry with Dr. Glynn Fagan   - Send referral to nutrition w/ bariatric dept for weight regain     
Gastric emptying study scheduled at [facility] 10/23/2023 at 10:15 a.m. Pt notified.   
Pt scheduled for Manometry 11/16/2023  
Referral placed to bariatric dept.   Manometry referral faxed to Dr. Fagan   
No

## 2023-11-15 ENCOUNTER — TRANSCRIPTION ENCOUNTER (OUTPATIENT)
Age: 43
End: 2023-11-15

## 2023-11-15 ENCOUNTER — INPATIENT (INPATIENT)
Facility: HOSPITAL | Age: 43
LOS: 0 days | Discharge: HOME | DRG: 743 | End: 2023-11-16
Attending: GENERAL ACUTE CARE HOSPITAL | Admitting: GENERAL ACUTE CARE HOSPITAL
Payer: COMMERCIAL

## 2023-11-15 ENCOUNTER — RESULT REVIEW (OUTPATIENT)
Age: 43
End: 2023-11-15

## 2023-11-15 VITALS
DIASTOLIC BLOOD PRESSURE: 82 MMHG | OXYGEN SATURATION: 100 % | RESPIRATION RATE: 16 BRPM | WEIGHT: 195.11 LBS | SYSTOLIC BLOOD PRESSURE: 134 MMHG | TEMPERATURE: 98 F | HEART RATE: 95 BPM

## 2023-11-15 VITALS
OXYGEN SATURATION: 97 % | DIASTOLIC BLOOD PRESSURE: 68 MMHG | RESPIRATION RATE: 18 BRPM | SYSTOLIC BLOOD PRESSURE: 133 MMHG | HEART RATE: 81 BPM

## 2023-11-15 DIAGNOSIS — N83.201 UNSPECIFIED OVARIAN CYST, RIGHT SIDE: ICD-10-CM

## 2023-11-15 DIAGNOSIS — Z98.891 HISTORY OF UTERINE SCAR FROM PREVIOUS SURGERY: Chronic | ICD-10-CM

## 2023-11-15 DIAGNOSIS — Z90.49 ACQUIRED ABSENCE OF OTHER SPECIFIED PARTS OF DIGESTIVE TRACT: Chronic | ICD-10-CM

## 2023-11-15 LAB
ALBUMIN SERPL ELPH-MCNC: 4.4 G/DL — SIGNIFICANT CHANGE UP (ref 3.5–5.2)
ALBUMIN SERPL ELPH-MCNC: 4.4 G/DL — SIGNIFICANT CHANGE UP (ref 3.5–5.2)
ALP SERPL-CCNC: 86 U/L — SIGNIFICANT CHANGE UP (ref 30–115)
ALP SERPL-CCNC: 86 U/L — SIGNIFICANT CHANGE UP (ref 30–115)
ALT FLD-CCNC: 12 U/L — SIGNIFICANT CHANGE UP (ref 0–41)
ALT FLD-CCNC: 12 U/L — SIGNIFICANT CHANGE UP (ref 0–41)
ANION GAP SERPL CALC-SCNC: 11 MMOL/L — SIGNIFICANT CHANGE UP (ref 7–14)
ANION GAP SERPL CALC-SCNC: 11 MMOL/L — SIGNIFICANT CHANGE UP (ref 7–14)
APPEARANCE UR: CLEAR — SIGNIFICANT CHANGE UP
APPEARANCE UR: CLEAR — SIGNIFICANT CHANGE UP
AST SERPL-CCNC: 14 U/L — SIGNIFICANT CHANGE UP (ref 0–41)
AST SERPL-CCNC: 14 U/L — SIGNIFICANT CHANGE UP (ref 0–41)
BASOPHILS # BLD AUTO: 0.04 K/UL — SIGNIFICANT CHANGE UP (ref 0–0.2)
BASOPHILS # BLD AUTO: 0.04 K/UL — SIGNIFICANT CHANGE UP (ref 0–0.2)
BASOPHILS NFR BLD AUTO: 0.6 % — SIGNIFICANT CHANGE UP (ref 0–1)
BASOPHILS NFR BLD AUTO: 0.6 % — SIGNIFICANT CHANGE UP (ref 0–1)
BILIRUB SERPL-MCNC: 0.5 MG/DL — SIGNIFICANT CHANGE UP (ref 0.2–1.2)
BILIRUB SERPL-MCNC: 0.5 MG/DL — SIGNIFICANT CHANGE UP (ref 0.2–1.2)
BILIRUB UR-MCNC: NEGATIVE — SIGNIFICANT CHANGE UP
BILIRUB UR-MCNC: NEGATIVE — SIGNIFICANT CHANGE UP
BUN SERPL-MCNC: 13 MG/DL — SIGNIFICANT CHANGE UP (ref 10–20)
BUN SERPL-MCNC: 13 MG/DL — SIGNIFICANT CHANGE UP (ref 10–20)
CALCIUM SERPL-MCNC: 9 MG/DL — SIGNIFICANT CHANGE UP (ref 8.4–10.5)
CALCIUM SERPL-MCNC: 9 MG/DL — SIGNIFICANT CHANGE UP (ref 8.4–10.5)
CHLORIDE SERPL-SCNC: 99 MMOL/L — SIGNIFICANT CHANGE UP (ref 98–110)
CHLORIDE SERPL-SCNC: 99 MMOL/L — SIGNIFICANT CHANGE UP (ref 98–110)
CO2 SERPL-SCNC: 27 MMOL/L — SIGNIFICANT CHANGE UP (ref 17–32)
CO2 SERPL-SCNC: 27 MMOL/L — SIGNIFICANT CHANGE UP (ref 17–32)
COLOR SPEC: YELLOW — SIGNIFICANT CHANGE UP
COLOR SPEC: YELLOW — SIGNIFICANT CHANGE UP
CREAT SERPL-MCNC: 0.7 MG/DL — SIGNIFICANT CHANGE UP (ref 0.7–1.5)
CREAT SERPL-MCNC: 0.7 MG/DL — SIGNIFICANT CHANGE UP (ref 0.7–1.5)
DIFF PNL FLD: NEGATIVE — SIGNIFICANT CHANGE UP
DIFF PNL FLD: NEGATIVE — SIGNIFICANT CHANGE UP
EGFR: 110 ML/MIN/1.73M2 — SIGNIFICANT CHANGE UP
EGFR: 110 ML/MIN/1.73M2 — SIGNIFICANT CHANGE UP
EOSINOPHIL # BLD AUTO: 0.03 K/UL — SIGNIFICANT CHANGE UP (ref 0–0.7)
EOSINOPHIL # BLD AUTO: 0.03 K/UL — SIGNIFICANT CHANGE UP (ref 0–0.7)
EOSINOPHIL NFR BLD AUTO: 0.4 % — SIGNIFICANT CHANGE UP (ref 0–8)
EOSINOPHIL NFR BLD AUTO: 0.4 % — SIGNIFICANT CHANGE UP (ref 0–8)
GLUCOSE SERPL-MCNC: 86 MG/DL — SIGNIFICANT CHANGE UP (ref 70–99)
GLUCOSE SERPL-MCNC: 86 MG/DL — SIGNIFICANT CHANGE UP (ref 70–99)
GLUCOSE UR QL: NEGATIVE MG/DL — SIGNIFICANT CHANGE UP
GLUCOSE UR QL: NEGATIVE MG/DL — SIGNIFICANT CHANGE UP
HCG SERPL QL: NEGATIVE — SIGNIFICANT CHANGE UP
HCG SERPL QL: NEGATIVE — SIGNIFICANT CHANGE UP
HCT VFR BLD CALC: 37.5 % — SIGNIFICANT CHANGE UP (ref 37–47)
HCT VFR BLD CALC: 37.5 % — SIGNIFICANT CHANGE UP (ref 37–47)
HGB BLD-MCNC: 11.7 G/DL — LOW (ref 12–16)
HGB BLD-MCNC: 11.7 G/DL — LOW (ref 12–16)
IMM GRANULOCYTES NFR BLD AUTO: 0.3 % — SIGNIFICANT CHANGE UP (ref 0.1–0.3)
IMM GRANULOCYTES NFR BLD AUTO: 0.3 % — SIGNIFICANT CHANGE UP (ref 0.1–0.3)
KETONES UR-MCNC: ABNORMAL MG/DL
KETONES UR-MCNC: ABNORMAL MG/DL
LEUKOCYTE ESTERASE UR-ACNC: NEGATIVE — SIGNIFICANT CHANGE UP
LEUKOCYTE ESTERASE UR-ACNC: NEGATIVE — SIGNIFICANT CHANGE UP
LIDOCAIN IGE QN: 34 U/L — SIGNIFICANT CHANGE UP (ref 7–60)
LIDOCAIN IGE QN: 34 U/L — SIGNIFICANT CHANGE UP (ref 7–60)
LYMPHOCYTES # BLD AUTO: 1.75 K/UL — SIGNIFICANT CHANGE UP (ref 1.2–3.4)
LYMPHOCYTES # BLD AUTO: 1.75 K/UL — SIGNIFICANT CHANGE UP (ref 1.2–3.4)
LYMPHOCYTES # BLD AUTO: 24.4 % — SIGNIFICANT CHANGE UP (ref 20.5–51.1)
LYMPHOCYTES # BLD AUTO: 24.4 % — SIGNIFICANT CHANGE UP (ref 20.5–51.1)
MCHC RBC-ENTMCNC: 24.2 PG — LOW (ref 27–31)
MCHC RBC-ENTMCNC: 24.2 PG — LOW (ref 27–31)
MCHC RBC-ENTMCNC: 31.2 G/DL — LOW (ref 32–37)
MCHC RBC-ENTMCNC: 31.2 G/DL — LOW (ref 32–37)
MCV RBC AUTO: 77.6 FL — LOW (ref 81–99)
MCV RBC AUTO: 77.6 FL — LOW (ref 81–99)
MONOCYTES # BLD AUTO: 0.59 K/UL — SIGNIFICANT CHANGE UP (ref 0.1–0.6)
MONOCYTES # BLD AUTO: 0.59 K/UL — SIGNIFICANT CHANGE UP (ref 0.1–0.6)
MONOCYTES NFR BLD AUTO: 8.2 % — SIGNIFICANT CHANGE UP (ref 1.7–9.3)
MONOCYTES NFR BLD AUTO: 8.2 % — SIGNIFICANT CHANGE UP (ref 1.7–9.3)
NEUTROPHILS # BLD AUTO: 4.75 K/UL — SIGNIFICANT CHANGE UP (ref 1.4–6.5)
NEUTROPHILS # BLD AUTO: 4.75 K/UL — SIGNIFICANT CHANGE UP (ref 1.4–6.5)
NEUTROPHILS NFR BLD AUTO: 66.1 % — SIGNIFICANT CHANGE UP (ref 42.2–75.2)
NEUTROPHILS NFR BLD AUTO: 66.1 % — SIGNIFICANT CHANGE UP (ref 42.2–75.2)
NITRITE UR-MCNC: NEGATIVE — SIGNIFICANT CHANGE UP
NITRITE UR-MCNC: NEGATIVE — SIGNIFICANT CHANGE UP
NRBC # BLD: 0 /100 WBCS — SIGNIFICANT CHANGE UP (ref 0–0)
NRBC # BLD: 0 /100 WBCS — SIGNIFICANT CHANGE UP (ref 0–0)
PH UR: 6.5 — SIGNIFICANT CHANGE UP (ref 5–8)
PH UR: 6.5 — SIGNIFICANT CHANGE UP (ref 5–8)
PLATELET # BLD AUTO: 291 K/UL — SIGNIFICANT CHANGE UP (ref 130–400)
PLATELET # BLD AUTO: 291 K/UL — SIGNIFICANT CHANGE UP (ref 130–400)
PMV BLD: 9.8 FL — SIGNIFICANT CHANGE UP (ref 7.4–10.4)
PMV BLD: 9.8 FL — SIGNIFICANT CHANGE UP (ref 7.4–10.4)
POTASSIUM SERPL-MCNC: 4.3 MMOL/L — SIGNIFICANT CHANGE UP (ref 3.5–5)
POTASSIUM SERPL-MCNC: 4.3 MMOL/L — SIGNIFICANT CHANGE UP (ref 3.5–5)
POTASSIUM SERPL-SCNC: 4.3 MMOL/L — SIGNIFICANT CHANGE UP (ref 3.5–5)
POTASSIUM SERPL-SCNC: 4.3 MMOL/L — SIGNIFICANT CHANGE UP (ref 3.5–5)
PROT SERPL-MCNC: 7.2 G/DL — SIGNIFICANT CHANGE UP (ref 6–8)
PROT SERPL-MCNC: 7.2 G/DL — SIGNIFICANT CHANGE UP (ref 6–8)
PROT UR-MCNC: SIGNIFICANT CHANGE UP MG/DL
PROT UR-MCNC: SIGNIFICANT CHANGE UP MG/DL
RBC # BLD: 4.83 M/UL — SIGNIFICANT CHANGE UP (ref 4.2–5.4)
RBC # BLD: 4.83 M/UL — SIGNIFICANT CHANGE UP (ref 4.2–5.4)
RBC # FLD: 13.7 % — SIGNIFICANT CHANGE UP (ref 11.5–14.5)
RBC # FLD: 13.7 % — SIGNIFICANT CHANGE UP (ref 11.5–14.5)
SODIUM SERPL-SCNC: 137 MMOL/L — SIGNIFICANT CHANGE UP (ref 135–146)
SODIUM SERPL-SCNC: 137 MMOL/L — SIGNIFICANT CHANGE UP (ref 135–146)
SP GR SPEC: 1.02 — SIGNIFICANT CHANGE UP (ref 1–1.03)
SP GR SPEC: 1.02 — SIGNIFICANT CHANGE UP (ref 1–1.03)
UROBILINOGEN FLD QL: 1 MG/DL — SIGNIFICANT CHANGE UP (ref 0.2–1)
UROBILINOGEN FLD QL: 1 MG/DL — SIGNIFICANT CHANGE UP (ref 0.2–1)
WBC # BLD: 7.18 K/UL — SIGNIFICANT CHANGE UP (ref 4.8–10.8)
WBC # BLD: 7.18 K/UL — SIGNIFICANT CHANGE UP (ref 4.8–10.8)
WBC # FLD AUTO: 7.18 K/UL — SIGNIFICANT CHANGE UP (ref 4.8–10.8)
WBC # FLD AUTO: 7.18 K/UL — SIGNIFICANT CHANGE UP (ref 4.8–10.8)

## 2023-11-15 PROCEDURE — 58662 LAPAROSCOPY EXCISE LESIONS: CPT

## 2023-11-15 PROCEDURE — C1889: CPT

## 2023-11-15 PROCEDURE — 99285 EMERGENCY DEPT VISIT HI MDM: CPT

## 2023-11-15 PROCEDURE — 74177 CT ABD & PELVIS W/CONTRAST: CPT | Mod: 26,MA

## 2023-11-15 PROCEDURE — 88305 TISSUE EXAM BY PATHOLOGIST: CPT

## 2023-11-15 PROCEDURE — C9399: CPT

## 2023-11-15 PROCEDURE — 76830 TRANSVAGINAL US NON-OB: CPT | Mod: 26

## 2023-11-15 RX ORDER — KETOROLAC TROMETHAMINE 30 MG/ML
30 SYRINGE (ML) INJECTION ONCE
Refills: 0 | Status: DISCONTINUED | OUTPATIENT
Start: 2023-11-15 | End: 2023-11-15

## 2023-11-15 RX ORDER — MORPHINE SULFATE 50 MG/1
4 CAPSULE, EXTENDED RELEASE ORAL
Refills: 0 | Status: DISCONTINUED | OUTPATIENT
Start: 2023-11-15 | End: 2023-11-16

## 2023-11-15 RX ORDER — SODIUM CHLORIDE 9 MG/ML
1000 INJECTION, SOLUTION INTRAVENOUS
Refills: 0 | Status: DISCONTINUED | OUTPATIENT
Start: 2023-11-15 | End: 2023-11-16

## 2023-11-15 RX ORDER — SODIUM CHLORIDE 9 MG/ML
1000 INJECTION INTRAMUSCULAR; INTRAVENOUS; SUBCUTANEOUS ONCE
Refills: 0 | Status: COMPLETED | OUTPATIENT
Start: 2023-11-15 | End: 2023-11-15

## 2023-11-15 RX ADMIN — SODIUM CHLORIDE 1000 MILLILITER(S): 9 INJECTION INTRAMUSCULAR; INTRAVENOUS; SUBCUTANEOUS at 10:43

## 2023-11-15 RX ADMIN — Medication 30 MILLIGRAM(S): at 10:43

## 2023-11-15 RX ADMIN — SODIUM CHLORIDE 100 MILLILITER(S): 9 INJECTION, SOLUTION INTRAVENOUS at 21:24

## 2023-11-15 NOTE — ED PROVIDER NOTE - PHYSICAL EXAMINATION
VITAL SIGNS: I have reviewed nursing notes and confirm.  CONSTITUTIONAL: Patient is in no acute distress.  SKIN: Skin exam is warm and dry, no acute rash.  HEAD: Normocephalic; atraumatic.  EYES:  EOM intact; conjunctiva and sclera clear.  ENT: No nasal discharge; airway clear.    CARD: S1, S2 normal;  Regular rate and rhythm.  RESP: Clear to auscultation bilaterally. No wheezes, rales or rhonchi.  ABD: Soft; non-distended; non-tender.   EXT: Normal ROM. No edema.   NEURO: Alert, oriented. Grossly unremarkable. No focal deficits.  PSYCH: Cooperative, appropriate. VITAL SIGNS: I have reviewed nursing notes and confirm.  CONSTITUTIONAL: Patient is in no acute distress.  SKIN: Skin exam is warm and dry, no acute rash.  HEAD: Normocephalic; atraumatic.  EYES:  EOM intact; conjunctiva and sclera clear.  ENT: No nasal discharge; airway clear.    CARD: S1, S2 normal;  Regular rate and rhythm.  RESP: Clear to auscultation bilaterally. No wheezes, rales or rhonchi.  ABD: Soft; non-distended; +Mild diffuse TTP though greater in RLQ. No suprapubic TTP.   EXT: Normal ROM. No edema.   NEURO: Alert, oriented. Grossly unremarkable. No focal deficits.  PSYCH: Cooperative, appropriate. VITAL SIGNS: I have reviewed nursing notes and confirm.  CONSTITUTIONAL: Patient is in no acute distress.  SKIN: Skin exam is warm and dry, no acute rash.  HEAD: Normocephalic; atraumatic.  EYES:  EOM intact; conjunctiva and sclera clear.  ENT: No nasal discharge; airway clear.    CARD: S1, S2 normal;  Regular rate and rhythm.  RESP: Clear to auscultation bilaterally. No wheezes, rales or rhonchi.  ABD: Soft; non-distended; +Mild diffuse TTP, Localized TTP to RLQ. No suprapubic TTP.   EXT: Normal ROM. No edema.   NEURO: Alert, oriented. Grossly unremarkable. No focal deficits.  PSYCH: Cooperative, appropriate.

## 2023-11-15 NOTE — ED PROVIDER NOTE - PRINCIPAL DIAGNOSIS
Encounter Date: 2/13/2020       History     Chief Complaint   Patient presents with    Flank Pain     Pt flank pain.  Pt states that he was seen yesterday for same, pain continues.       45 yo m, h/o HTN, c/o left flank pain x 1 week, intermittent, severe at times.  Last episode this am.  Seen here yesterday, pain free during my assessment, UA negative for blood/infection.  No fever/chills/cp.  No n/v/d.      The history is provided by the patient.     Review of patient's allergies indicates:  No Known Allergies  Past Medical History:   Diagnosis Date    Asthma     Hypertension      History reviewed. No pertinent surgical history.  Family History   Problem Relation Age of Onset    Heart disease Mother     Asthma Sister     Asthma Brother      Social History     Tobacco Use    Smoking status: Never Smoker    Smokeless tobacco: Never Used   Substance Use Topics    Alcohol use: Not Currently     Comment: about 5 times a month, none today    Drug use: Not Currently     Types: Marijuana     Comment: MJ 5x per day     Review of Systems   Constitutional: Negative for chills, diaphoresis, fatigue and fever.   Gastrointestinal: Negative for abdominal pain.   Genitourinary: Positive for flank pain. Negative for difficulty urinating, dysuria and hematuria.       Physical Exam     Initial Vitals [02/13/20 1025]   BP Pulse Resp Temp SpO2   (!) 174/120 68 16 98.4 °F (36.9 °C) 99 %      MAP       --         Physical Exam    Nursing note and vitals reviewed.  Constitutional: He appears well-developed and well-nourished. He is not diaphoretic. No distress.   HENT:   Mouth/Throat: Oropharynx is clear and moist.   Eyes: Conjunctivae are normal.   Neck: Neck supple.   Cardiovascular: Normal rate, regular rhythm and normal heart sounds.   Pulmonary/Chest: No respiratory distress.   Abdominal: He exhibits no distension. There is no tenderness. There is no rebound and no guarding.   Musculoskeletal: He exhibits no edema.    Neurological: He is alert.   Skin: Skin is warm and dry. No pallor.   Psychiatric: He has a normal mood and affect. Thought content normal.         ED Course   Procedures  Labs Reviewed   ISTAT PROCEDURE          Imaging Results    None          Medical Decision Making:   History:   Old Medical Records: I decided to obtain old medical records.  Initial Assessment:   47 yo m, left flank pain  Differential Diagnosis:   Musculoskeletal pain vs stone  Infection ruled out yesterday  Clinical Tests:   Lab Tests: Ordered and Reviewed  ED Management:  ISTAT  CT abd/pelvis                   ED Course as of Feb 13 1940   Thu Feb 13, 2020   1403 Informed by nurse that pt gerry.    [AS]      ED Course User Index  [AS] Aylin Childers MD                Clinical Impression:       ICD-10-CM ICD-9-CM   1. Left flank pain R10.9 789.09                             Aylin Childers MD  02/13/20 1940     Right ovarian cyst

## 2023-11-15 NOTE — CHART NOTE - NSCHARTNOTEFT_GEN_A_CORE
PACU ANESTHESIA ADMISSION NOTE      Procedure: removal hemorrhagic cyst  Post op diagnosis:  hemorrhagic cyst        __x__  Patent Airway    __x__  Full return of protective reflexes    ____  Full recovery from anesthesia / back to baseline status    Vitals:  T(C): 35.6 (11-15-23 @ 19:30), Max: 36.7 (11-15-23 @ 19:25)  HR: 86 (11-15-23 @ 19:30) (86 - 98)  BP: 145/80 (11-15-23 @ 19:30) (134/82 - 145/80)  RR: 16 (11-15-23 @ 19:30) (15 - 16)  SpO2: 98% (11-15-23 @ 19:30) (97% - 100%)    Mental Status:  __x__ Awake   ___x__ Alert   _____ Drowsy   _____ Sedated    Nausea/Vomiting:  __x__ NO  ______Yes,   See Post - Op Orders          Pain Scale (0-10):  _____    Treatment: ____ None    __x__ See Post - Op/PCA Orders    Post - Operative Fluids:   ____ Oral   __x__ See Post - Op Orders    Plan: Discharge:   ____Home       ___x__Floor     _____Critical Care    _____  Other:_________________    Comments: Patient had smooth intraoperative event, no anesthesia complication.  PACU Vital signs: 97, 14, 98%, 98rr, 125/64

## 2023-11-15 NOTE — ED PROVIDER NOTE - PROGRESS NOTE DETAILS
Kar: Pt endorsed to dr. Ramirez TVUS showing +right ovarian cyst. Right ovary >left.  GYN consulted.  Requesting admission to GYN service under Dr Gonzales.

## 2023-11-15 NOTE — H&P ADULT - HISTORY OF PRESENT ILLNESS
44 yo P3, LMP 10/23/23, h/o appendectomy, LTCS and HTN, presents for 1 day of RLQ pain. Pt reports yesterday afternoon she had sudden onset RLQ pain, sharp, 10/10, without inciting factors. Pain continued intermittently, oscillating between sharp 10/10 pain and dull 5/10 cramping. This AM, pain continued in a similar fashion so she presented to ED. Reports pain minimally improved with Toradol in the ED, however continues to be persistent and she notes it is difficult to walk. Denies fever, chill, N/V, diarrhea or constipation.     Denies the following: constitutional symptoms, visual symptoms, cardiovascular symptoms, respiratory symptoms, GI symptoms, musculoskeletal symptoms, skin symptoms, neurologic symptoms, hematologic symptoms, allergic symptoms, psychiatric symptoms  Except any pertinent positives listed.     Last PO intake yesterday    Ob/Gyn History:                   LMP - 10/23                  Cycle Length - regular   x2  LTCS x1  h/o uterine fibroids, HSV  denies h/o ovarian cysts, abnormal pap

## 2023-11-15 NOTE — PRE-OP CHECKLIST - PATIENT SENT TO
operating room VITAL SIGNS: I have reviewed nursing notes and confirm.  CONSTITUTIONAL: thin, elderly women in no acute distress  SKIN: Warm dry, normal skin turgor  HEAD: NCAT  EYES: EOMI, PERRLA, no scleral icterus  ENT: Moist mucous membranes, normal pharynx with no erythema or exudates  NECK: Supple; non tender. Full ROM. No cervical LAD  CARD: RRR, no murmurs, rubs or gallops  RESP: decreased breath sounds to left lung.  ABD: soft, + BS, non-tender, non-distended, no rebound or guarding. No CVA tenderness  Back: + significant scolosis to thoracic spine  EXT: Full ROM, no bony tenderness, no pedal edema, no calf tenderness  NEURO: normal motor. normal sensory. CN II-XII intact. Cerebellar testing normal. Normal gait.  PSYCH: Cooperative, appropriate.

## 2023-11-15 NOTE — ED PROVIDER NOTE - NS ED ROS FT
Review of Systems:  	•	CONSTITUTIONAL - no fever, no diaphoresis, no chills  	•	SKIN - no rash  	•	HEMATOLOGIC - no bleeding, no bruising  	•	EYES - no eye pain, no blurry vision  	•	ENT - no congestion  	•	RESPIRATORY - no shortness of breath, no cough  	•	CARDIAC - no chest pain, no palpitations  	•	GI - +abd pain, no nausea, no vomiting, no diarrhea, no constipation  	•	GENITO-URINARY - no dysuria; no hematuria, no increased urinary frequency  	•	MUSCULOSKELETAL - no joint paint, no swelling, no redness  	•	NEUROLOGIC - no weakness, no headache   	•	PSYCH - no anxiety, no depression  	All other ROS are negative except as documented in HPI.

## 2023-11-15 NOTE — ASU DISCHARGE PLAN (ADULT/PEDIATRIC) - NPI NUMBER (FOR SYSADMIN USE ONLY) :
Spoke to Amparo Austin, reviewed medication list and reported pt is not on any other androgen  [[id number]]

## 2023-11-15 NOTE — CONSULT NOTE ADULT - ASSESSMENT
A/P: 44 yo P3, h/o appendectomy, HTN, LTCS, with RLQ pain and 4cm cyst, r/o ovarian torsion, desiring surgical diagnosis and managment  -admit GYN  -NPO/IVF  -Anesthesia consult  -    INCOMPLETE

## 2023-11-15 NOTE — H&P ADULT - NSHPPHYSICALEXAM_GEN_ALL_CORE
Vital Signs Last 24 Hrs  T(C): 35.6 (15 Nov 2023 15:12), Max: 36.6 (15 Nov 2023 10:00)  T(F): 96.1 (15 Nov 2023 15:12), Max: 97.8 (15 Nov 2023 10:00)  HR: 86 (15 Nov 2023 15:12) (86 - 95)  BP: 145/80 (15 Nov 2023 15:12) (134/82 - 145/80)  BP(mean): --  RR: 16 (15 Nov 2023 15:12) (16 - 16)  SpO2: 98% (15 Nov 2023 15:12) (98% - 100%)    Parameters below as of 15 Nov 2023 15:12  Patient On (Oxygen Delivery Method): room air     General Appearance - AAOx3, uncomfortable  Abdomen - Soft, tender in RLQ, no rebound, voluntary guarding, no rigidity. bowel sounds present

## 2023-11-15 NOTE — H&P ADULT - NSHPLABSRESULTS_GEN_ALL_CORE
11.7   7.18  )-----------( 291      ( 15 Nov 2023 11:15 )             37.5       -15    137  |  99  |  13  ----------------------------<  86  4.3   |  27  |  0.7    Ca    9.0      15 Nov 2023 11:15    TPro  7.2  /  Alb  4.4  /  TBili  0.5  /  DBili  x   /  AST  14  /  ALT  12  /  AlkPhos  86  11-15        Urinalysis Basic - ( 15 Nov 2023 11:15 )    Color: Yellow / Appearance: Clear / S.024 / pH: x  Gluc: 86 mg/dL / Ketone: Trace mg/dL  / Bili: Negative / Urobili: 1.0 mg/dL   Blood: x / Protein: Trace mg/dL / Nitrite: Negative   Leuk Esterase: Negative / RBC: x / WBC x   Sq Epi: x / Non Sq Epi: x / Bacteria: x      < from: US Transvaginal (11.15.23 @ 16:13) >      PROCEDURE DATE:  11/15/2023          INTERPRETATION:  CLINICAL HISTORY / REASON FOR EXAM: Pelvic pain    CORRELATION: CT abdomen and pelvis from November 15, 2023    LMP: 2023    TECHNIQUE: Endovaginal and transabdominal pelvic sonogram. Color and   Spectral Doppler was performed.    FINDINGS:    Uterus: 11.6 x 5.8 x 5.4 cm. Subserosal calcified fibroid measuring 3.1 x   3.4 x 3.1 cm.  Endometrium: 9 mm. Within normal limits.    Right ovary: 5 x 4 x 4.4 cm. Cyst measuring 3.7 x 4 x 3.3 cm. Normal   arterial and venous waveforms.  Left ovary: 2.9 x 2.8 x 2 cm. Within normal limits. Normal arterial and   venous waveforms.    Fluid: Trace fluid.      IMPRESSION:    Right ovarian cyst measuring up to 4 cm, correlating with CT findings   from the same day.    Trace fluid within the lower pelvis, which can be physiologic.      O-RADS Score 2    RECOMMENDATION:    No follow up necessary for the ovaries

## 2023-11-15 NOTE — PRE-OP CHECKLIST - AICD PRESENT
Patient's daughter, Margaux Hurst, advised and verbalized understanding. Margaux Hurst listed on patient's consent. Recall in epic. no

## 2023-11-15 NOTE — ASU DISCHARGE PLAN (ADULT/PEDIATRIC) - CARE PROVIDER_API CALL
Luigi Sykes  Obstetrics and Gynecology  [address] [place] [id number]  Phone: [phone]  Fax: [phone]  Follow Up Time: 2 weeks

## 2023-11-15 NOTE — ED PROVIDER NOTE - CONSIDERATION OF ADMISSION OBSERVATION
Patient requires admission to hospital, to go to OR for R pelvic pain, ovarian cyst, possible ovarian torsion. Consideration of Admission/Observation

## 2023-11-15 NOTE — PRE-OP CHECKLIST - INTERNAL PROSTHESES
Call from patient wondering if Dr. Mckeon is the most appropriate cardiologist for the patient to see. 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 Reviewed with patient that Dr. Garcia would be a more appropriate choice for the patient to see. Patient connected with scheduling to set up OV with Dr. Garcia instead of Dr. Mckeon.   
no

## 2023-11-15 NOTE — BRIEF OPERATIVE NOTE - OPERATION/FINDINGS
Normal appearing uterus, bilateral tubes, and left ovary. Ruptured right hemorrhagic ovarian cyst. Hemoperitoneum. Adhesions from omentum to abdominal wall. Normal appearing uterus, bilateral tubes, and left ovary. Ruptured right hemorrhagic ovarian cyst. Hemoperitoneum. Adhesions from omentum to abdominal wall.  att note   DR Gonzales   agree with finding

## 2023-11-15 NOTE — ED PROVIDER NOTE - OBJECTIVE STATEMENT
42 yo F with PMH of HTN and appendectomy presents to the ER with c/o right lower abdominal pain x yesterday. States pain is constant, sharp. Endorses generalized abdominal discomfort yesterday following eating [employer], though states pain is worsening. Denies fever, chills, flank pain, dysuria, frequency, N/V/D or other complaints.

## 2023-11-15 NOTE — H&P ADULT - ASSESSMENT
A/P: 42 yo P3, h/o appendectomy, HTN, LTCS, with RLQ pain and 4cm cyst suspicious for ovarian torsion, desiring surgical diagnosis and management  -admit GYN  -NPO/IVF  -Anesthesia consult  -Level 1 to OR for diagnostic laparoscopy, possible cystectomy, possible salpingooophorectomy R/B/A discussed including bleeding, infection, damage to surrounding structures, possible normal findings without diagnosis. Consent signed.      Dr. Gonzales at bedside

## 2023-11-15 NOTE — ED PROVIDER NOTE - ATTENDING APP SHARED VISIT CONTRIBUTION OF CARE
I have personally performed a history and physical exam on this patient and personally directed the management of the patient.  44 yo F with PMH of HTN and appendectomy presents to the ER with c/o right lower abdominal pain x yesterday. States pain is constant, sharp. Endorses generalized abdominal discomfort yesterday following eating [employer], though states pain is worsening. Denies fever, chills, flank pain, dysuria, frequency, N/V/D or other complaints.  CON: appears stated age, pleasant, no acute distress, HENMT: normocephalic, atraumatic, anicteric, no conjunctival injection,  CV: regular rhythm, distal pulses intact, RESP: no acute respiratory distress, no stridor, breathing comfortably on RA , GI:  soft, RLQ tenderness, no rebound, no guarding, SKIN: no wounds MSK: no deformities, NEURO: no gross motor or sensory deficit Psychiatric: appropriate mood, appropriate affect  will send labs, ua uc, ct ap, pain meds, ivf and reevaluate

## 2023-11-15 NOTE — CONSULT NOTE ADULT - SUBJECTIVE AND OBJECTIVE BOX
PGY2 Note  Chief Complaint: RLQ pain    HPI: 44 yo P3, LMP 10/25, h/o appendectomy, LTCS and HTN, presents for 1 day of RLQ pain. Pt reports yesterday afternoon she had sudden onset RLQ pain, sharp, 10/10, without inciting factors. Pain continued intermittently, oscillating between sharp 10/10 pain and dull 5/10 cramping. This AM, pain continued,     Location -  Severity -  Quality -  Duration -  Timing -   Modifying Factors -   Associated Signs/Symptoms -     Ob/Gyn History:  G P                 LMP -                   Cycle Length -   Denies history of ovarian cysts, uterine fibroids, abnormal paps, or STIs  Last Pap Smear -   Last Mammogram -   Last Colonoscopy -        OBhx:    Denies the following: constitutional symptoms, visual symptoms, cardiovascular symptoms, respiratory symptoms, GI symptoms, musculoskeletal symptoms, skin symptoms, neurologic symptoms, hematologic symptoms, allergic symptoms, psychiatric symptoms  Except any pertinent positives listed.     Home Medications:      Allergies    No Known Drug Allergies  Peaches (Rash)  Carrots (Rash)    Intolerances        PAST MEDICAL & SURGICAL HISTORY:  Hypertension          FAMILY HISTORY:      SOCIAL HISTORY: Denies cigarette use, alcohol use, or illicit drug use    Vital Signs Last 24 Hrs  T(F): 96.1 (15 Nov 2023 15:12), Max: 97.8 (15 Nov 2023 10:00)  HR: 86 (15 Nov 2023 15:12) (86 - 95)  BP: 145/80 (15 Nov 2023 15:12) (134/82 - 145/80)  RR: 16 (15 Nov 2023 15:12) (16 - 16)    Weight (kg): 88.5 (-15-23 @ 10:00)    UO:     General Appearance - AAOx3, NAD  Heart - S1S2 regular rate and rhythm  Lung - CTA Bilaterally  Abdomen - Soft, nontender, nondistended, no rebound, no rigidity, no guarding, bowel sounds present    GYN/Pelvis:    Labia Majora - Normal  Labia Minora - Normal  Clitoris - Normal  Urethra - Normal  Vagina - Normal  Cervix - Normal    Uterus:  Size - Normal  Tenderness - None  Mass - None  Freely mobile    Adnexa:  Masses - None  Tenderness - None      Meds:   (ADM OVERRIDE) 1 each &lt;see task&gt; GiveOnce  ketorolac   Injectable 30 milliGRAM(s) IV Push once  sodium chloride 0.9% Bolus 1000 milliLiter(s) IV Bolus once      Weight (kg): 88.5 (11-15-23 @ 10:00)    LABS:                        11.7   7.18  )-----------( 291      ( 15 Nov 2023 11:15 )             37.5         11-15    137  |  99  |  13  ----------------------------<  86  4.3   |  27  |  0.7    Ca    9.0      15 Nov 2023 11:15    TPro  7.2  /  Alb  4.4  /  TBili  0.5  /  DBili  x   /  AST  14  /  ALT  12  /  AlkPhos  86  11-15      Urinalysis Basic - ( 15 Nov 2023 11:15 )    Color: Yellow / Appearance: Clear / S.024 / pH: x  Gluc: 86 mg/dL / Ketone: Trace mg/dL  / Bili: Negative / Urobili: 1.0 mg/dL   Blood: x / Protein: Trace mg/dL / Nitrite: Negative   Leuk Esterase: Negative / RBC: x / WBC x   Sq Epi: x / Non Sq Epi: x / Bacteria: x          RADIOLOGY & ADDITIONAL STUDIES:   PGY2 Note  Chief Complaint: RLQ pain    HPI: 42 yo P3, LMP 10/23/, h/o appendectomy, LTCS and HTN, presents for 1 day of RLQ pain. Pt reports yesterday afternoon she had sudden onset RLQ pain, sharp, 10/10, without inciting factors. Pain continued intermittently, oscillating between sharp 10/10 pain and dull 5/10 cramping. This AM, pain continued in a similar fashion so she presented to ED. Reports pain minimally improved with Toradol in the ED, however continues to be persistent and she notes it is difficult to walk. Denies fever, chill, N/V, diarrhea or constipation.     Denies the following: constitutional symptoms, visual symptoms, cardiovascular symptoms, respiratory symptoms, GI symptoms, musculoskeletal symptoms, skin symptoms, neurologic symptoms, hematologic symptoms, allergic symptoms, psychiatric symptoms  Except any pertinent positives listed.     Last PO intake yesterday    Ob/Gyn History:                   LMP - 10/23                  Cycle Length - regular   x2  LTCS x1  h/o uterine fibroids, HSV  denies h/o ovarian cysts, abnormal pap    Home Medications: Labetalol 100mg BID    Allergies  No Known Drug Allergies  Peaches (Rash)  Carrots (Rash)  Intolerances    PAST MEDICAL & SURGICAL HISTORY:  Hypertension  h/o appendectomy  h/o LTCS    SOCIAL HISTORY: Denies cigarette use, alcohol use, or illicit drug use    Vital Signs Last 24 Hrs  T(F): 96.1 (15 Nov 2023 15:12), Max: 97.8 (15 Nov 2023 10:00)  HR: 86 (15 Nov 2023 15:12) (86 - 95)  BP: 145/80 (15 Nov 2023 15:12) (134/82 - 145/80)  RR: 16 (15 Nov 2023 15:12) (16 - 16)    Weight (kg): 88.5 (11-15-23 @ 10:00)    UO:     General Appearance - AAOx3, uncomfortable  Abdomen - Soft, tender in RLQ, no rebound, voluntary guarding, no rigidity. bowel sounds present      Meds:   (ADM OVERRIDE) 1 each &lt;see task&gt; GiveOnce  ketorolac   Injectable 30 milliGRAM(s) IV Push once  sodium chloride 0.9% Bolus 1000 milliLiter(s) IV Bolus once      Weight (kg): 88.5 (11-15-23 @ 10:00)    LABS:                        11.7   7.18  )-----------( 291      ( 15 Nov 2023 11:15 )             37.5         11-15    137  |  99  |  13  ----------------------------<  86  4.3   |  27  |  0.7    Ca    9.0      15 Nov 2023 11:15    TPro  7.2  /  Alb  4.4  /  TBili  0.5  /  DBili  x   /  AST  14  /  ALT  12  /  AlkPhos  86  11-15      Urinalysis Basic - ( 15 Nov 2023 11:15 )    Color: Yellow / Appearance: Clear / S.024 / pH: x  Gluc: 86 mg/dL / Ketone: Trace mg/dL  / Bili: Negative / Urobili: 1.0 mg/dL   Blood: x / Protein: Trace mg/dL / Nitrite: Negative   Leuk Esterase: Negative / RBC: x / WBC x   Sq Epi: x / Non Sq Epi: x / Bacteria: x      RADIOLOGY & ADDITIONAL STUDIES:  < from: US Transvaginal (11.15.23 @ 16:13) >  PROCEDURE DATE:  11/15/2023          INTERPRETATION:  CLINICAL HISTORY / REASON FOR EXAM: Pelvic pain    CORRELATION: CT abdomen and pelvis from November 15, 2023    LMP: 2023    TECHNIQUE: Endovaginal and transabdominal pelvic sonogram. Color and   Spectral Doppler was performed.    FINDINGS:    Uterus: 11.6 x 5.8 x 5.4 cm. Subserosal calcified fibroid measuring 3.1 x   3.4 x 3.1 cm.  Endometrium: 9 mm. Within normal limits.    Right ovary: 5 x 4 x 4.4 cm. Cyst measuring 3.7 x 4 x 3.3 cm. Normal   arterial and venous waveforms.  Left ovary: 2.9 x 2.8 x 2 cm. Within normal limits. Normal arterial and   venous waveforms.    Fluid: Trace fluid.      IMPRESSION:    Right ovarian cyst measuring up to 4 cm, correlating with CT findings   from the same day.    Trace fluid within the lower pelvis, which can be physiologic.      O-RADS Score 2    RECOMMENDATION:    No follow up necessary for the ovaries    < end of copied text >  < from: CT Abdomen and Pelvis w/ IV Cont (11.15.23 @ 13:08) >    PROCEDURE DATE:  11/15/2023          INTERPRETATION:  CLINICAL STATEMENT: Right lower quadrant pain, history   of appendectomy    TECHNIQUE: Contiguous axial CT images were obtained from the lower chest   to the pubic symphysis following administration of intravenous contrast.    100 cc administered of Omnipaque 350 (0 cc discarded).  Oral contrast was   not administered.  Reformatted images in the coronal and sagittal planes   were acquired.    COMPARISON CT: 10/26/2014      FINDINGS:    LOWER CHEST: Atelectasis.    HEPATOBILIARY: Unremarkable...    SPLEEN: Unremarkable..    PANCREAS: Unremarkable..    ADRENAL GLANDS: Unremarkable..    KIDNEYS: Punctate nonobstructing renal calculus in lower pole of the   right kidney. There is no evidence of hydronephrosis with respect to   either kidney.    ABDOMINOPELVIC NODES: Unremarkable...    PELVIC ORGANS: The bladder is nondistended. There is a rim calcified   lesion in the fundus of the uterus, likely representing a partially   calcified fibroid. There is a 4.4 cm right adnexal cyst. Infiltrative   changes are noted in the right lower quadrant, findings may be   postoperative in nature in this patient with a history of appendectomy.   Alternatively, these may be related to the right adnexa. The uterus and   adnexa are incompletely evaluated on CT.. There are additional   nonspecific infiltrative changes just posterior to the anterior abdominal   wall within the pelvis..    PERITONEUM/MESENTERY/BOWEL: There are nonspecific infiltrative changes   around the rectosigmoid colon. The patient is status post appendectomy   with postsurgical change. There is a small hiatal hernia.. There is no   free air or obstruction..    BONES/SOFT TISSUES: Degenerative change...    OTHER: The abdominal aorta is normal in caliber...      IMPRESSION:    Right adnexal cyst measuring 4.4 cm, incompletely evaluated on CT.    Adjacent infiltrative changes may be postsurgical in nature in this   patient status post appendectomy or may be related to the right adnexa.    Nonspecific indeterminate infiltrative changes around the rectosigmoid   colon and just posterior to the anterior abdominal wall of the pelvis,    Attention on follow-up imaging.      < end of copied text >

## 2023-11-15 NOTE — ED PROVIDER NOTE - CLINICAL SUMMARY MEDICAL DECISION MAKING FREE TEXT BOX
Patient signed out to me from Dr. Ro -43-year-old female with history of HTN, s/p appy, in ER with complaint of RLQ pain x1 day.  Labs reviewed: CBC/CMP unremarkable.  Serum pregnancy negative.  CT abdomen: R adnexal cyst measuring 4.4 cm.  Pelvic ultrasound: R ovarian cyst measuring up to 4 cm, trace fluid within the liver pelvis.  Patient seen and evaluated by GYN, to be admitted to GYN service to go to OR.

## 2023-11-15 NOTE — ASU DISCHARGE PLAN (ADULT/PEDIATRIC) - NS MD DC FALL RISK RISK
For information on Fall & Injury Prevention, visit: [email or web address] OR  [email or web address] OR  https://www.cdc.gov/steadi/patient.html

## 2023-11-20 LAB
SURGICAL PATHOLOGY STUDY: SIGNIFICANT CHANGE UP
SURGICAL PATHOLOGY STUDY: SIGNIFICANT CHANGE UP

## 2023-11-21 NOTE — ASSESSMENT
[FreeTextEntry1] : The patient has had HTN of pregnancy possible preeclampsia . BP has been well controlled on current medication . No edema . No SOB . The patient 's BP although of 128systolic is lower on home cuff  which has been on the low normal range  bilateral.  Nails within normal limits. Fissures absent, bilateral. Hyperkeratotic tissue is absent. Asessment: Patient is a 80 y.o. female with:    Diagnosis Orders   1. Achilles tendinitis of left lower extremity        2. Equinus deformity of both feet            Plan: Patient examined and evaluated. Current condition and treatment options discussed in detail. X rays reviewed with the pt in detail. All questions answered. Achilles tendonitis Treatment    1. Stretching - 3 times per day minimum for 5-10 minutes. Include stretching against a wall or counter with one foot back and heel flat,    Leaning into a step, or using a towel or belt. Be active and aggressive with your stretching for maximum benefit. 2.  Ice - 3 times per day. 3.  Anti-inflammatory - take OTC or Rx as ordered. 4.  Arch support and supportive shoe gear. Wear orthotics or prefabricated arch supports at ALL times in supportive shoegear. Do NOT go barefoot. Orders Placed This Encounter   Medications    Elastic Bandages & Supports (ANKLE SPLINT/NIGHT AIRFORM) MISC     Si Device by Does not apply route every evening Achilles tendinitis of left lower extremity  (primary encounter diagnosis)  Equinus deformity of both feet      Dispense posterior night splint. Dispense:  1 each     Refill:  0    methylPREDNISolone (MEDROL DOSEPACK) 4 MG tablet     Sig: Take by mouth. Dispense:  1 kit     Refill:  0   Moderate level medical decision making. 1 x-ray reviewed from another provider. Acute condition. Moderate risk morbidity with a new prescription drug management  . Appropriate shoe gear Discussed patient will go barefoot. Contact office with any questions/problems/concerns. RTC in 3week(s).

## 2023-11-22 DIAGNOSIS — N83.201 UNSPECIFIED OVARIAN CYST, RIGHT SIDE: ICD-10-CM

## 2023-11-22 DIAGNOSIS — Z91.018 ALLERGY TO OTHER FOODS: ICD-10-CM

## 2023-11-22 DIAGNOSIS — I10 ESSENTIAL (PRIMARY) HYPERTENSION: ICD-10-CM

## 2023-11-24 ENCOUNTER — OUTPATIENT (OUTPATIENT)
Dept: OUTPATIENT SERVICES | Facility: HOSPITAL | Age: 43
LOS: 1 days | End: 2023-11-24
Payer: COMMERCIAL

## 2023-11-24 ENCOUNTER — APPOINTMENT (OUTPATIENT)
Dept: OBGYN | Facility: CLINIC | Age: 43
End: 2023-11-24
Payer: COMMERCIAL

## 2023-11-24 VITALS
SYSTOLIC BLOOD PRESSURE: 138 MMHG | BODY MASS INDEX: 37.17 KG/M2 | RESPIRATION RATE: 18 BRPM | HEART RATE: 76 BPM | HEIGHT: 62 IN | OXYGEN SATURATION: 100 % | DIASTOLIC BLOOD PRESSURE: 80 MMHG | TEMPERATURE: 98 F | WEIGHT: 202 LBS

## 2023-11-24 DIAGNOSIS — Z00.00 ENCOUNTER FOR GENERAL ADULT MEDICAL EXAMINATION WITHOUT ABNORMAL FINDINGS: ICD-10-CM

## 2023-11-24 DIAGNOSIS — Z98.891 HISTORY OF UTERINE SCAR FROM PREVIOUS SURGERY: Chronic | ICD-10-CM

## 2023-11-24 DIAGNOSIS — Z90.49 ACQUIRED ABSENCE OF OTHER SPECIFIED PARTS OF DIGESTIVE TRACT: Chronic | ICD-10-CM

## 2023-11-24 PROCEDURE — 99213 OFFICE O/P EST LOW 20 MIN: CPT

## 2023-11-24 RX ADMIN — MEDROXYPROGESTERONE ACETATE 0 MG/ML: 150 INJECTION, SUSPENSION INTRAMUSCULAR at 00:00

## 2023-11-26 LAB — HCG UR QL: NEGATIVE

## 2023-11-27 ENCOUNTER — NON-APPOINTMENT (OUTPATIENT)
Age: 43
End: 2023-11-27

## 2023-11-27 DIAGNOSIS — N92.0 EXCESSIVE AND FREQUENT MENSTRUATION WITH REGULAR CYCLE: ICD-10-CM

## 2023-11-27 DIAGNOSIS — N83.209 UNSPECIFIED OVARIAN CYST, UNSPECIFIED SIDE: ICD-10-CM

## 2023-11-27 RX ORDER — OFLOXACIN 3 MG/ML
0.3 SOLUTION/ DROPS OPHTHALMIC 4 TIMES DAILY
Qty: 1 | Refills: 3 | Status: ACTIVE | COMMUNITY
Start: 2023-11-27 | End: 1900-01-01

## 2023-12-01 ENCOUNTER — OUTPATIENT (OUTPATIENT)
Dept: OUTPATIENT SERVICES | Facility: HOSPITAL | Age: 43
LOS: 1 days | End: 2023-12-01
Payer: COMMERCIAL

## 2023-12-01 ENCOUNTER — APPOINTMENT (OUTPATIENT)
Dept: CARDIOLOGY | Facility: CLINIC | Age: 43
End: 2023-12-01
Payer: COMMERCIAL

## 2023-12-01 ENCOUNTER — APPOINTMENT (OUTPATIENT)
Dept: OBGYN | Facility: CLINIC | Age: 43
End: 2023-12-01
Payer: COMMERCIAL

## 2023-12-01 VITALS — BODY MASS INDEX: 35.3 KG/M2 | WEIGHT: 193 LBS | SYSTOLIC BLOOD PRESSURE: 133 MMHG | DIASTOLIC BLOOD PRESSURE: 83 MMHG

## 2023-12-01 DIAGNOSIS — R00.2 PALPITATIONS: ICD-10-CM

## 2023-12-01 DIAGNOSIS — I10 ESSENTIAL (PRIMARY) HYPERTENSION: ICD-10-CM

## 2023-12-01 DIAGNOSIS — Z00.00 ENCOUNTER FOR GENERAL ADULT MEDICAL EXAMINATION WITHOUT ABNORMAL FINDINGS: ICD-10-CM

## 2023-12-01 DIAGNOSIS — Z98.891 HISTORY OF UTERINE SCAR FROM PREVIOUS SURGERY: Chronic | ICD-10-CM

## 2023-12-01 DIAGNOSIS — N83.209 UNSPECIFIED OVARIAN CYST, UNSPECIFIED SIDE: ICD-10-CM

## 2023-12-01 DIAGNOSIS — Z90.49 ACQUIRED ABSENCE OF OTHER SPECIFIED PARTS OF DIGESTIVE TRACT: Chronic | ICD-10-CM

## 2023-12-01 PROCEDURE — 99204 OFFICE O/P NEW MOD 45 MIN: CPT

## 2023-12-01 PROCEDURE — 99024 POSTOP FOLLOW-UP VISIT: CPT

## 2023-12-01 PROCEDURE — 93244 EXT ECG>48HR<7D REV&INTERPJ: CPT

## 2023-12-04 DIAGNOSIS — N83.209 UNSPECIFIED OVARIAN CYST, UNSPECIFIED SIDE: ICD-10-CM

## 2023-12-15 ENCOUNTER — APPOINTMENT (OUTPATIENT)
Dept: CARDIOLOGY | Facility: CLINIC | Age: 43
End: 2023-12-15

## 2023-12-18 ENCOUNTER — APPOINTMENT (OUTPATIENT)
Dept: CARDIOLOGY | Facility: CLINIC | Age: 43
End: 2023-12-18

## 2023-12-22 ENCOUNTER — APPOINTMENT (OUTPATIENT)
Dept: CARDIOLOGY | Facility: CLINIC | Age: 43
End: 2023-12-22

## 2023-12-28 ENCOUNTER — APPOINTMENT (OUTPATIENT)
Dept: CARDIOLOGY | Facility: CLINIC | Age: 43
End: 2023-12-28

## 2024-02-02 ENCOUNTER — APPOINTMENT (OUTPATIENT)
Dept: OBGYN | Facility: CLINIC | Age: 44
End: 2024-02-02
Payer: COMMERCIAL

## 2024-02-02 ENCOUNTER — OUTPATIENT (OUTPATIENT)
Dept: OUTPATIENT SERVICES | Facility: HOSPITAL | Age: 44
LOS: 1 days | End: 2024-02-02
Payer: COMMERCIAL

## 2024-02-02 VITALS
SYSTOLIC BLOOD PRESSURE: 142 MMHG | HEIGHT: 62 IN | BODY MASS INDEX: 35.51 KG/M2 | WEIGHT: 193 LBS | DIASTOLIC BLOOD PRESSURE: 95 MMHG

## 2024-02-02 DIAGNOSIS — Z90.49 ACQUIRED ABSENCE OF OTHER SPECIFIED PARTS OF DIGESTIVE TRACT: Chronic | ICD-10-CM

## 2024-02-02 DIAGNOSIS — Z87.59 PERSONAL HISTORY OF OTHER COMPLICATIONS OF PREGNANCY, CHILDBIRTH AND THE PUERPERIUM: ICD-10-CM

## 2024-02-02 DIAGNOSIS — Z30.9 ENCOUNTER FOR CONTRACEPTIVE MANAGEMENT, UNSPECIFIED: ICD-10-CM

## 2024-02-02 DIAGNOSIS — Z98.891 HISTORY OF UTERINE SCAR FROM PREVIOUS SURGERY: Chronic | ICD-10-CM

## 2024-02-02 DIAGNOSIS — Z30.432 ENCOUNTER FOR REMOVAL OF INTRAUTERINE CONTRACEPTIVE DEVICE: ICD-10-CM

## 2024-02-02 PROCEDURE — 90471 IMMUNIZATION ADMIN: CPT

## 2024-02-02 PROCEDURE — 99212 OFFICE O/P EST SF 10 MIN: CPT | Mod: 24

## 2024-02-02 PROCEDURE — 99212 OFFICE O/P EST SF 10 MIN: CPT

## 2024-02-02 RX ORDER — MEDROXYPROGESTERONE ACETATE 150 MG/ML
150 INJECTION, SUSPENSION INTRAMUSCULAR
Qty: 0 | Refills: 0 | Status: COMPLETED | OUTPATIENT
Start: 2024-02-02

## 2024-02-02 NOTE — HISTORY OF PRESENT ILLNESS
[FreeTextEntry1] : 43yo P3 presents for Depo-Provera injection. She is doing well overall, no complaints. She did not have any vaginal bleeding for 2 months and started to have spotting over the last 3 weeks. She denies any fever, chill, nausea, vomiting, dysuria, vaginal discharge.

## 2024-02-02 NOTE — DISCUSSION/SUMMARY
[FreeTextEntry1] : 45yo P3, presents for depo provera injection  -Depo-Provera injected left deltoid  -RTC 3 months for depo and annual

## 2024-02-05 PROBLEM — Z30.9 ENCOUNTER FOR CONTRACEPTIVE MANAGEMENT, UNSPECIFIED TYPE: Status: ACTIVE | Noted: 2024-02-05

## 2024-02-07 DIAGNOSIS — Z30.9 ENCOUNTER FOR CONTRACEPTIVE MANAGEMENT, UNSPECIFIED: ICD-10-CM

## 2024-02-15 ENCOUNTER — APPOINTMENT (OUTPATIENT)
Dept: CARDIOLOGY | Facility: CLINIC | Age: 44
End: 2024-02-15
Payer: COMMERCIAL

## 2024-02-15 PROCEDURE — 93975 VASCULAR STUDY: CPT

## 2024-03-18 RX ORDER — LABETALOL HYDROCHLORIDE 100 MG/1
100 TABLET, FILM COATED ORAL
Qty: 60 | Refills: 3 | Status: ACTIVE | COMMUNITY
Start: 2023-10-05 | End: 1900-01-01

## 2024-03-25 ENCOUNTER — APPOINTMENT (OUTPATIENT)
Dept: OBGYN | Facility: CLINIC | Age: 44
End: 2024-03-25
Payer: COMMERCIAL

## 2024-03-25 ENCOUNTER — OUTPATIENT (OUTPATIENT)
Dept: OUTPATIENT SERVICES | Facility: HOSPITAL | Age: 44
LOS: 1 days | End: 2024-03-25
Payer: COMMERCIAL

## 2024-03-25 VITALS
SYSTOLIC BLOOD PRESSURE: 140 MMHG | BODY MASS INDEX: 35.51 KG/M2 | HEIGHT: 62 IN | DIASTOLIC BLOOD PRESSURE: 82 MMHG | WEIGHT: 193 LBS

## 2024-03-25 DIAGNOSIS — Z00.00 ENCOUNTER FOR GENERAL ADULT MEDICAL EXAMINATION WITHOUT ABNORMAL FINDINGS: ICD-10-CM

## 2024-03-25 DIAGNOSIS — Z90.49 ACQUIRED ABSENCE OF OTHER SPECIFIED PARTS OF DIGESTIVE TRACT: Chronic | ICD-10-CM

## 2024-03-25 DIAGNOSIS — Z98.891 HISTORY OF UTERINE SCAR FROM PREVIOUS SURGERY: Chronic | ICD-10-CM

## 2024-03-25 DIAGNOSIS — D21.9 BENIGN NEOPLASM OF CONNECTIVE AND OTHER SOFT TISSUE, UNSPECIFIED: ICD-10-CM

## 2024-03-25 PROCEDURE — 99214 OFFICE O/P EST MOD 30 MIN: CPT

## 2024-03-25 PROCEDURE — 99213 OFFICE O/P EST LOW 20 MIN: CPT

## 2024-03-25 PROCEDURE — 99459 PELVIC EXAMINATION: CPT

## 2024-03-26 DIAGNOSIS — N93.9 ABNORMAL UTERINE AND VAGINAL BLEEDING, UNSPECIFIED: ICD-10-CM

## 2024-03-26 DIAGNOSIS — D21.9 BENIGN NEOPLASM OF CONNECTIVE AND OTHER SOFT TISSUE, UNSPECIFIED: ICD-10-CM

## 2024-03-26 NOTE — PLAN
[FreeTextEntry1] : AUB r/o anovulation vs fibroids vs possible polyp.  Labs and sono ordered  will call patient with results and discuss further w/u  Given that her fibroid is relatively small and not submucous, there may be other causes to her bleeding.  Options including ablation, OCPS, progesterone, Mirena IUD, as well as hysterectomy discussed.

## 2024-03-26 NOTE — HISTORY OF PRESENT ILLNESS
[FreeTextEntry1] : Patient c/o irregular/heavy bleeding for the past month with clots. She is here to discuss possible treatment modalities including possible hysterectomy. She  recently underwent a laparoscopy for a ruptured ovarian cyst. She had preop imaging including a sonogram and a ct scan:  ACC: [id number]     EXAM:  US TRANSVAGINAL   ORDERED BY: CAMMY LOYA PROCEDURE DATE:  11/15/2023 INTERPRETATION:  CLINICAL HISTORY / REASON FOR EXAM: Pelvic pain CORRELATION: CT abdomen and pelvis from November 15, 2023 LMP: October 2023 TECHNIQUE: Endovaginal and transabdominal pelvic sonogram. Color and Spectral Doppler was performed. FINDINGS: Uterus: 11.6 x 5.8 x 5.4 cm. Subserosal calcified fibroid measuring 3.1 x 3.4 x 3.1 cm. Endometrium: 9 mm. Within normal limits. Right ovary: 5 x 4 x 4.4 cm. Cyst measuring 3.7 x 4 x 3.3 cm. Normal arterial and venous waveforms. Left ovary: 2.9 x 2.8 x 2 cm. Within normal limits. Normal arterial and venous waveforms. Fluid: Trace fluid. IMPRESSION: Right ovarian cyst measuring up to 4 cm, correlating with CT findings from the same day. Trace fluid within the lower pelvis, which can be physiologic. O-RADS Score 2 RECOMMENDATION: No follow up necessary for the ovaries O-RADS Score US v2022 --Assessment Categories 0 - Incomplete Evaluation (N/A) 1 - Normal Ovary (N/A) 2 - Almost Certainly Benign (<1%) 3 - Low Risk Malignancy (1-10%) 4 - Intermediate Risk Malignancy (10-50%) 5 - High Risk Malignancy (>50%) [email or web address] --- End of Report ---   ACC: [id number]     EXAM:  CT ABDOMEN AND PELVIS IC   ORDERED BY: CAMMY LOYA PROCEDURE DATE:  11/15/2023 INTERPRETATION:  CLINICAL STATEMENT: Right lower quadrant pain, history of appendectomy TECHNIQUE: Contiguous axial CT images were obtained from the lower chest to the pubic symphysis following administration of intravenous contrast.  100 cc administered of Omnipaque 350 (0 cc discarded).  Oral contrast was not administered.  Reformatted images in the coronal and sagittal planes were acquired. COMPARISON CT: 10/26/2014 FINDINGS: LOWER CHEST: Atelectasis. HEPATOBILIARY: Unremarkable... SPLEEN: Unremarkable.. PANCREAS: Unremarkable.. ADRENAL GLANDS: Unremarkable.. KIDNEYS: Punctate nonobstructing renal calculus in lower pole of the right kidney. There is no evidence of hydronephrosis with respect to either kidney. ABDOMINOPELVIC NODES: Unremarkable... PELVIC ORGANS: The bladder is nondistended. There is a rim calcified lesion in the fundus of the uterus, likely representing a partially calcified fibroid. There is a 4.4 cm right adnexal cyst. Infiltrative changes are noted in the right lower quadrant, findings may be postoperative in nature in this patient with a history of appendectomy. Alternatively, these may be related to the right adnexa. The uterus and adnexa are incompletely evaluated on CT.. There are additional nonspecific infiltrative changes just posterior to the anterior abdominal wall within the pelvis.. PERITONEUM/MESENTERY/BOWEL: There are nonspecific infiltrative changes around the rectosigmoid colon. The patient is status post appendectomy with postsurgical change. There is a small hiatal hernia.. There is no free air or obstruction.. BONES/SOFT TISSUES: Degenerative change... OTHER: The abdominal aorta is normal in caliber... IMPRESSION: Right adnexal cyst measuring 4.4 cm, incompletely evaluated on CT. Adjacent infiltrative changes may be postsurgical in nature in this patient status post appendectomy or may be related to the right adnexa. Nonspecific indeterminate infiltrative changes around the rectosigmoid colon and just posterior to the anterior abdominal wall of the pelvis, Attention on follow-up imaging. --- End of Report ---

## 2024-03-26 NOTE — PHYSICAL EXAM
[Chaperone Present] : A chaperone was present in the examining room during all aspects of the physical examination [Soft] : soft [Non-tender] : non-tender [Non-distended] : non-distended [No HSM] : No HSM [No Lesions] : no lesions [No Mass] : no mass [Labia Majora] : normal [Labia Minora] : normal [Moderate] : There was moderate vaginal bleeding [Normal] : normal [Uterine Adnexae] : normal

## 2024-04-11 ENCOUNTER — OUTPATIENT (OUTPATIENT)
Dept: OUTPATIENT SERVICES | Facility: HOSPITAL | Age: 44
LOS: 1 days | End: 2024-04-11
Payer: MEDICAID

## 2024-04-11 ENCOUNTER — NON-APPOINTMENT (OUTPATIENT)
Age: 44
End: 2024-04-11

## 2024-04-11 DIAGNOSIS — Z00.8 ENCOUNTER FOR OTHER GENERAL EXAMINATION: ICD-10-CM

## 2024-04-11 DIAGNOSIS — N93.9 ABNORMAL UTERINE AND VAGINAL BLEEDING, UNSPECIFIED: ICD-10-CM

## 2024-04-11 DIAGNOSIS — Z90.49 ACQUIRED ABSENCE OF OTHER SPECIFIED PARTS OF DIGESTIVE TRACT: Chronic | ICD-10-CM

## 2024-04-11 DIAGNOSIS — Z98.891 HISTORY OF UTERINE SCAR FROM PREVIOUS SURGERY: Chronic | ICD-10-CM

## 2024-04-11 PROCEDURE — 76830 TRANSVAGINAL US NON-OB: CPT | Mod: 26

## 2024-04-11 PROCEDURE — 76830 TRANSVAGINAL US NON-OB: CPT

## 2024-04-11 PROCEDURE — 76856 US EXAM PELVIC COMPLETE: CPT

## 2024-04-11 PROCEDURE — 76856 US EXAM PELVIC COMPLETE: CPT | Mod: 26

## 2024-04-12 ENCOUNTER — OUTPATIENT (OUTPATIENT)
Dept: OUTPATIENT SERVICES | Facility: HOSPITAL | Age: 44
LOS: 1 days | End: 2024-04-12
Payer: MEDICAID

## 2024-04-12 DIAGNOSIS — Z90.49 ACQUIRED ABSENCE OF OTHER SPECIFIED PARTS OF DIGESTIVE TRACT: Chronic | ICD-10-CM

## 2024-04-12 DIAGNOSIS — N39.9 DISORDER OF URINARY SYSTEM, UNSPECIFIED: ICD-10-CM

## 2024-04-12 DIAGNOSIS — N93.9 ABNORMAL UTERINE AND VAGINAL BLEEDING, UNSPECIFIED: ICD-10-CM

## 2024-04-12 DIAGNOSIS — Z98.891 HISTORY OF UTERINE SCAR FROM PREVIOUS SURGERY: Chronic | ICD-10-CM

## 2024-04-12 PROCEDURE — 82166 ASSAY ANTI-MULLERIAN HORM: CPT

## 2024-04-12 PROCEDURE — 80053 COMPREHEN METABOLIC PANEL: CPT

## 2024-04-12 PROCEDURE — 83550 IRON BINDING TEST: CPT

## 2024-04-12 PROCEDURE — 84443 ASSAY THYROID STIM HORMONE: CPT

## 2024-04-12 PROCEDURE — 82728 ASSAY OF FERRITIN: CPT

## 2024-04-12 PROCEDURE — 84702 CHORIONIC GONADOTROPIN TEST: CPT

## 2024-04-12 PROCEDURE — 36415 COLL VENOUS BLD VENIPUNCTURE: CPT

## 2024-04-12 PROCEDURE — 84439 ASSAY OF FREE THYROXINE: CPT

## 2024-04-12 PROCEDURE — 84146 ASSAY OF PROLACTIN: CPT

## 2024-04-12 PROCEDURE — 85027 COMPLETE CBC AUTOMATED: CPT

## 2024-04-12 PROCEDURE — 83001 ASSAY OF GONADOTROPIN (FSH): CPT

## 2024-04-12 PROCEDURE — 82670 ASSAY OF TOTAL ESTRADIOL: CPT

## 2024-04-12 PROCEDURE — 83540 ASSAY OF IRON: CPT

## 2024-04-13 DIAGNOSIS — N39.9 DISORDER OF URINARY SYSTEM, UNSPECIFIED: ICD-10-CM

## 2024-04-16 ENCOUNTER — OUTPATIENT (OUTPATIENT)
Dept: OUTPATIENT SERVICES | Facility: HOSPITAL | Age: 44
LOS: 1 days | End: 2024-04-16
Payer: MEDICAID

## 2024-04-16 VITALS
HEIGHT: 62 IN | RESPIRATION RATE: 18 BRPM | HEART RATE: 84 BPM | TEMPERATURE: 98 F | SYSTOLIC BLOOD PRESSURE: 139 MMHG | OXYGEN SATURATION: 100 % | WEIGHT: 205.03 LBS | DIASTOLIC BLOOD PRESSURE: 82 MMHG

## 2024-04-16 DIAGNOSIS — Z01.818 ENCOUNTER FOR OTHER PREPROCEDURAL EXAMINATION: ICD-10-CM

## 2024-04-16 DIAGNOSIS — Z98.890 OTHER SPECIFIED POSTPROCEDURAL STATES: Chronic | ICD-10-CM

## 2024-04-16 DIAGNOSIS — D25.9 LEIOMYOMA OF UTERUS, UNSPECIFIED: ICD-10-CM

## 2024-04-16 DIAGNOSIS — Z98.891 HISTORY OF UTERINE SCAR FROM PREVIOUS SURGERY: Chronic | ICD-10-CM

## 2024-04-16 DIAGNOSIS — Z90.49 ACQUIRED ABSENCE OF OTHER SPECIFIED PARTS OF DIGESTIVE TRACT: Chronic | ICD-10-CM

## 2024-04-16 LAB
ALBUMIN SERPL ELPH-MCNC: 4.3 G/DL — SIGNIFICANT CHANGE UP (ref 3.5–5.2)
ALP SERPL-CCNC: 83 U/L — SIGNIFICANT CHANGE UP (ref 30–115)
ALT FLD-CCNC: 17 U/L — SIGNIFICANT CHANGE UP (ref 0–41)
ANION GAP SERPL CALC-SCNC: 14 MMOL/L — SIGNIFICANT CHANGE UP (ref 7–14)
AST SERPL-CCNC: 16 U/L — SIGNIFICANT CHANGE UP (ref 0–41)
BASOPHILS # BLD AUTO: 0.05 K/UL — SIGNIFICANT CHANGE UP (ref 0–0.2)
BASOPHILS NFR BLD AUTO: 1.3 % — HIGH (ref 0–1)
BILIRUB SERPL-MCNC: 0.4 MG/DL — SIGNIFICANT CHANGE UP (ref 0.2–1.2)
BUN SERPL-MCNC: 13 MG/DL — SIGNIFICANT CHANGE UP (ref 10–20)
CALCIUM SERPL-MCNC: 9.1 MG/DL — SIGNIFICANT CHANGE UP (ref 8.4–10.5)
CHLORIDE SERPL-SCNC: 104 MMOL/L — SIGNIFICANT CHANGE UP (ref 98–110)
CO2 SERPL-SCNC: 23 MMOL/L — SIGNIFICANT CHANGE UP (ref 17–32)
CREAT SERPL-MCNC: 0.8 MG/DL — SIGNIFICANT CHANGE UP (ref 0.7–1.5)
EGFR: 93 ML/MIN/1.73M2 — SIGNIFICANT CHANGE UP
EOSINOPHIL # BLD AUTO: 0.07 K/UL — SIGNIFICANT CHANGE UP (ref 0–0.7)
EOSINOPHIL NFR BLD AUTO: 1.8 % — SIGNIFICANT CHANGE UP (ref 0–8)
GLUCOSE SERPL-MCNC: 88 MG/DL — SIGNIFICANT CHANGE UP (ref 70–99)
HCT VFR BLD CALC: 38 % — SIGNIFICANT CHANGE UP (ref 37–47)
HGB BLD-MCNC: 12.1 G/DL — SIGNIFICANT CHANGE UP (ref 12–16)
IMM GRANULOCYTES NFR BLD AUTO: 0.3 % — SIGNIFICANT CHANGE UP (ref 0.1–0.3)
LYMPHOCYTES # BLD AUTO: 1.49 K/UL — SIGNIFICANT CHANGE UP (ref 1.2–3.4)
LYMPHOCYTES # BLD AUTO: 37.3 % — SIGNIFICANT CHANGE UP (ref 20.5–51.1)
MCHC RBC-ENTMCNC: 24.5 PG — LOW (ref 27–31)
MCHC RBC-ENTMCNC: 31.8 G/DL — LOW (ref 32–37)
MCV RBC AUTO: 77.1 FL — LOW (ref 81–99)
MONOCYTES # BLD AUTO: 0.39 K/UL — SIGNIFICANT CHANGE UP (ref 0.1–0.6)
MONOCYTES NFR BLD AUTO: 9.8 % — HIGH (ref 1.7–9.3)
NEUTROPHILS # BLD AUTO: 1.99 K/UL — SIGNIFICANT CHANGE UP (ref 1.4–6.5)
NEUTROPHILS NFR BLD AUTO: 49.5 % — SIGNIFICANT CHANGE UP (ref 42.2–75.2)
NRBC # BLD: 0 /100 WBCS — SIGNIFICANT CHANGE UP (ref 0–0)
PLATELET # BLD AUTO: 290 K/UL — SIGNIFICANT CHANGE UP (ref 130–400)
PMV BLD: 9.6 FL — SIGNIFICANT CHANGE UP (ref 7.4–10.4)
POTASSIUM SERPL-MCNC: 4.4 MMOL/L — SIGNIFICANT CHANGE UP (ref 3.5–5)
POTASSIUM SERPL-SCNC: 4.4 MMOL/L — SIGNIFICANT CHANGE UP (ref 3.5–5)
PROT SERPL-MCNC: 7.1 G/DL — SIGNIFICANT CHANGE UP (ref 6–8)
RBC # BLD: 4.93 M/UL — SIGNIFICANT CHANGE UP (ref 4.2–5.4)
RBC # FLD: 14.6 % — HIGH (ref 11.5–14.5)
SODIUM SERPL-SCNC: 141 MMOL/L — SIGNIFICANT CHANGE UP (ref 135–146)
WBC # BLD: 4 K/UL — LOW (ref 4.8–10.8)
WBC # FLD AUTO: 4 K/UL — LOW (ref 4.8–10.8)

## 2024-04-16 PROCEDURE — 85025 COMPLETE CBC W/AUTO DIFF WBC: CPT

## 2024-04-16 PROCEDURE — 93010 ELECTROCARDIOGRAM REPORT: CPT

## 2024-04-16 PROCEDURE — 99214 OFFICE O/P EST MOD 30 MIN: CPT | Mod: 25

## 2024-04-16 PROCEDURE — 80053 COMPREHEN METABOLIC PANEL: CPT

## 2024-04-16 PROCEDURE — 93005 ELECTROCARDIOGRAM TRACING: CPT

## 2024-04-16 PROCEDURE — 36415 COLL VENOUS BLD VENIPUNCTURE: CPT

## 2024-04-16 NOTE — H&P PST ADULT - HISTORY OF PRESENT ILLNESS
Patient is a 44 year old female presenting to PAST in preparation for  EXAM UNDER ANESTHESIA, DILATION AND CURETTAGE, DIAGNOSTIC HYSTEROSCOPY, POSSIBLE MYOSURE on 4/10  under gen anesthesia by Dr. Vasquez  PATIENT CURRENTLY DENIES CHEST PAIN  SHORTNESS OF BREATH  PALPITATIONS,  DYSURIA, OR UPPER RESPIRATORY INFECTION IN PAST 2 WEEKS    Anesthesia Alert  NO--Difficult Airway  NO--History of neck surgery or radiation  NO--Limited ROM of neck  NO--History of Malignant hyperthermia  NO--Personal or family history of Pseudocholinesterase deficiency  NO--Prior Anesthesia Complication  NO--Latex Allergy  NO--Loose teeth  NO--History of Rheumatoid Arthritis  NO--DARREL  NO-- BLEEDING RISK  NO--Other_____      Duke Activity Status Index (DASI) from [employer]  on 4/15/2024      RESULT SUMMARY:  58.2 points  The higher the score (maximum 58.2), the higher the functional status.    9.89 METs        INPUTS:  Take care of self —> 2.75 = Yes  Walk indoors —> 1.75 = Yes  Walk 1&ndash;2 blocks on level ground —> 2.75 = Yes  Climb a flight of stairs or walk up a hill —> 5.5 = Yes  Run a short distance —> 8 = Yes  Do light work around the house —> 2.7 = Yes  Do moderate work around the house —> 3.5 = Yes  Do heavy work around the house —> 8 = Yes  Do yardwork —> 4.5 = Yes  Have sexual relations —> 5.25 = Yes  Participate in moderate recreational activities —> 6 = Yes  Participate in strenuous sports —> 7.5 = Yes        Revised Cardiac Risk Index for Pre-Operative Risk from [employer]  on 4/15/2024      RESULT SUMMARY:  0 points  Class I Risk    3.9 %  30-day risk of death, MI, or cardiac arrest    From Duceppe 2017. These numbers are higher than those from the original study (Marcellus 1999). See Evidence for details.      INPUTS:  Elevated-risk surgery —> 0 = No  History of ischemic heart disease —> 0 = No  History of congestive heart failure —> 0 = No  History of cerebrovascular disease —> 0 = No  Pre-operative treatment with insulin —> 0 = No  Pre-operative creatinine >2 mg/dL / 176.8 µmol/L —> 0 = No        As per patient, this is their complete medical and surgical history, including medications both prescribed or over the counter.  Patient verbalized understanding of instructions and was given the opportunity to ask questions and have them answered.

## 2024-04-16 NOTE — H&P PST ADULT - COMMENTS
[facility] Lab Visit:    Migdalia Noriega  1981  [id number]    [id number] Pt arrived ambulatory w/o assist. Labs drawn per order via Left AC venipuncture x1 attempt. Pt tolerated well without complaints. Gauze/ coban to site. Pt departed [facility] ambulatory and in no distress at 1525.      Visit Vitals  /80 (BP 1 Location: Left upper arm, BP Patient Position: Sitting)   Pulse 65   Temp 98 °F (36.7 °C)   Resp 18   SpO2 100% pap 2023   no colonoscopy

## 2024-04-16 NOTE — H&P PST ADULT - NSICDXFAMILYHX_GEN_ALL_CORE_FT
FAMILY HISTORY:  Grandparent  Still living? Unknown  FH: ovarian cancer, Age at diagnosis: Age Unknown

## 2024-04-17 DIAGNOSIS — Z01.818 ENCOUNTER FOR OTHER PREPROCEDURAL EXAMINATION: ICD-10-CM

## 2024-04-17 DIAGNOSIS — D25.9 LEIOMYOMA OF UTERUS, UNSPECIFIED: ICD-10-CM

## 2024-04-18 LAB
ALBUMIN SERPL ELPH-MCNC: 4.5 G/DL
ALP BLD-CCNC: 89 U/L
ALT SERPL-CCNC: 15 U/L
ANION GAP SERPL CALC-SCNC: 15 MMOL/L
ANTI-MUELLERIAN HORMONE: 0.4 NG/ML
AST SERPL-CCNC: 15 U/L
BILIRUB SERPL-MCNC: 0.4 MG/DL
BUN SERPL-MCNC: 11 MG/DL
CALCIUM SERPL-MCNC: 9.1 MG/DL
CHLORIDE SERPL-SCNC: 104 MMOL/L
CO2 SERPL-SCNC: 22 MMOL/L
CREAT SERPL-MCNC: 0.8 MG/DL
EGFR: 93 ML/MIN/1.73M2
ESTRADIOL SERPL-MCNC: 64 PG/ML
FERRITIN SERPL-MCNC: 15 NG/ML
FSH SERPL-MCNC: 5.4 IU/L
GLUCOSE SERPL-MCNC: 89 MG/DL
HCG SERPL-MCNC: <1 MIU/ML
HCT VFR BLD CALC: 39.6 %
HGB BLD-MCNC: 12.2 G/DL
IRON SATN MFR SERPL: 13 %
IRON SERPL-MCNC: 51 UG/DL
MCHC RBC-ENTMCNC: 24.5 PG
MCHC RBC-ENTMCNC: 30.8 G/DL
MCV RBC AUTO: 79.7 FL
PLATELET # BLD AUTO: 301 K/UL
PMV BLD AUTO: 0 /100 WBCS
PMV BLD: 9.5 FL
POTASSIUM SERPL-SCNC: 4.5 MMOL/L
PROLACTIN SERPL-MCNC: 7.5 NG/ML
PROT SERPL-MCNC: 7.2 G/DL
RBC # BLD: 4.97 M/UL
RBC # FLD: 14.8 %
SODIUM SERPL-SCNC: 141 MMOL/L
T4 FREE SERPL-MCNC: 1.3 NG/DL
TESTOST FREE SERPL-MCNC: 1.6 PG/ML
TESTOST SERPL-MCNC: 15.5 NG/DL
TIBC SERPL-MCNC: 383 UG/DL
TSH SERPL-ACNC: 2.16 UIU/ML
UIBC SERPL-MCNC: 332 UG/DL
WBC # FLD AUTO: 6.08 K/UL

## 2024-04-19 ENCOUNTER — OUTPATIENT (OUTPATIENT)
Dept: OUTPATIENT SERVICES | Facility: HOSPITAL | Age: 44
LOS: 1 days | Discharge: HOME | End: 2024-04-19
Payer: MEDICAID

## 2024-04-19 ENCOUNTER — RESULT REVIEW (OUTPATIENT)
Age: 44
End: 2024-04-19

## 2024-04-19 ENCOUNTER — TRANSCRIPTION ENCOUNTER (OUTPATIENT)
Age: 44
End: 2024-04-19

## 2024-04-19 VITALS
RESPIRATION RATE: 14 BRPM | DIASTOLIC BLOOD PRESSURE: 75 MMHG | OXYGEN SATURATION: 96 % | SYSTOLIC BLOOD PRESSURE: 128 MMHG | HEART RATE: 84 BPM

## 2024-04-19 VITALS
SYSTOLIC BLOOD PRESSURE: 137 MMHG | HEIGHT: 62 IN | RESPIRATION RATE: 18 BRPM | HEART RATE: 79 BPM | DIASTOLIC BLOOD PRESSURE: 85 MMHG | TEMPERATURE: 98 F | WEIGHT: 205.03 LBS | OXYGEN SATURATION: 97 %

## 2024-04-19 DIAGNOSIS — N92.0 EXCESSIVE AND FREQUENT MENSTRUATION WITH REGULAR CYCLE: ICD-10-CM

## 2024-04-19 DIAGNOSIS — Z90.49 ACQUIRED ABSENCE OF OTHER SPECIFIED PARTS OF DIGESTIVE TRACT: Chronic | ICD-10-CM

## 2024-04-19 DIAGNOSIS — Z98.891 HISTORY OF UTERINE SCAR FROM PREVIOUS SURGERY: Chronic | ICD-10-CM

## 2024-04-19 DIAGNOSIS — Z98.890 OTHER SPECIFIED POSTPROCEDURAL STATES: Chronic | ICD-10-CM

## 2024-04-19 DIAGNOSIS — D25.9 LEIOMYOMA OF UTERUS, UNSPECIFIED: ICD-10-CM

## 2024-04-19 PROCEDURE — 88305 TISSUE EXAM BY PATHOLOGIST: CPT | Mod: 26

## 2024-04-19 PROCEDURE — 58558 HYSTEROSCOPY BIOPSY: CPT

## 2024-04-19 PROCEDURE — 88305 TISSUE EXAM BY PATHOLOGIST: CPT

## 2024-04-19 RX ORDER — HYDROMORPHONE HYDROCHLORIDE 2 MG/ML
0.5 INJECTION INTRAMUSCULAR; INTRAVENOUS; SUBCUTANEOUS
Refills: 0 | Status: DISCONTINUED | OUTPATIENT
Start: 2024-04-19 | End: 2024-04-19

## 2024-04-19 RX ORDER — LABETALOL HCL 100 MG
1 TABLET ORAL
Refills: 0 | DISCHARGE

## 2024-04-19 RX ORDER — MEDROXYPROGESTERONE ACETATE 150 MG/ML
0 INJECTION, SUSPENSION, EXTENDED RELEASE INTRAMUSCULAR
Refills: 0 | DISCHARGE

## 2024-04-19 RX ORDER — SODIUM CHLORIDE 9 MG/ML
1000 INJECTION, SOLUTION INTRAVENOUS
Refills: 0 | Status: DISCONTINUED | OUTPATIENT
Start: 2024-04-19 | End: 2024-04-19

## 2024-04-19 RX ORDER — OXYCODONE HYDROCHLORIDE 5 MG/1
5 TABLET ORAL ONCE
Refills: 0 | Status: DISCONTINUED | OUTPATIENT
Start: 2024-04-19 | End: 2024-04-19

## 2024-04-19 RX ORDER — ONDANSETRON 8 MG/1
4 TABLET, FILM COATED ORAL ONCE
Refills: 0 | Status: DISCONTINUED | OUTPATIENT
Start: 2024-04-19 | End: 2024-04-19

## 2024-04-19 RX ORDER — ACETAMINOPHEN 500 MG
1000 TABLET ORAL ONCE
Refills: 0 | Status: DISCONTINUED | OUTPATIENT
Start: 2024-04-19 | End: 2024-04-19

## 2024-04-19 RX ADMIN — SODIUM CHLORIDE 100 MILLILITER(S): 9 INJECTION, SOLUTION INTRAVENOUS at 08:16

## 2024-04-19 NOTE — CHART NOTE - NSCHARTNOTEFT_GEN_A_CORE
PACU ANESTHESIA ADMISSION NOTE      Procedure: Hysteroscopy; D&C; Exam under anesthesia  Post op diagnosis:  Abnormal Uterine Bleed    ____  Intubated  TV:______       Rate: ______      FiO2: ______    _x___  Patent Airway    _x___  Full return of protective reflexes    _x___  Full recovery from anesthesia / back to baseline status    Vitals:  T(C): 98.2F  HR: 83  BP: 124/84  RR: 12  SpO2: 97%    Mental Status:  _x___ Awake   _____ Alert   _____ Drowsy   _____ Sedated    Nausea/Vomiting:  _x___  NO       ______Yes,   See Post - Op Orders         Pain Scale (0-10):  __0___    Treatment: _x___ None    ____ See Post - Op/PCA Orders    Post - Operative Fluids:   __x__ Oral   ____ See Post - Op Orders    Plan: Discharge:   _x___Home       _____Floor     _____Critical Care    _____  Other:_________________    Comments:  No anesthesia issues or complications noted.  Discharge when criteria met.

## 2024-04-19 NOTE — ASU DISCHARGE PLAN (ADULT/PEDIATRIC) - CARE PROVIDER_API CALL
Chemo Vasquez  Obstetrics and Gynecology  [address] [place] [address]  Phone: [phone]  Fax: [phone]  Follow Up Time: 2 weeks

## 2024-04-19 NOTE — BRIEF OPERATIVE NOTE - OPERATION/FINDINGS
on bimanual: anteverted, approx 10 week size uterus  on hysteroscopy: nl appearing vagina and cervix. polyp noted on posterior wall with base covering about 50% of posterior wall. Nl appearing endometrium throughout. Bl ostia visualized. No other discrete polyps or fibroids noted.

## 2024-04-19 NOTE — ASU PREOP CHECKLIST - HAND OFF
Called PT to schedule 1 yr follow up.  PT is scheduled for 2/5/24 @ 11 am with Dr. Roxy Jenkins.   
Unit RN to OR RN

## 2024-04-19 NOTE — BRIEF OPERATIVE NOTE - NSICDXBRIEFPROCEDURE_GEN_ALL_CORE_FT
PROCEDURES:  Hysteroscopy with dilation and curettage of uterus using MyoSure device 19-Apr-2024 08:07:20  Petty Cast

## 2024-04-19 NOTE — ASU PREOP CHECKLIST - RESPIRATORY RATE (BREATHS/MIN)
[FreeTextEntry1] : D/W caregiver well child visit; reviewed breast feeding or formula feeding with iron fortified formula, advise vitamin D daily supplement for  infants. Reviewed safety recommendations including- back to sleep, rear facing car seat, smoke detectors and carbon dioxide detectors at home; avoid tobacco/vaping/smoking exposure;reviewed solid food introduction between 4-6months of age; reviewed age appropriate development; reviewed and consented vaccinations.\par Reviewed feeding plan, wake at night if pt sleeping longer than 4hrs, f/u 1 week for weight check.\par 
18

## 2024-04-22 LAB — SURGICAL PATHOLOGY STUDY: SIGNIFICANT CHANGE UP

## 2024-04-24 PROBLEM — N83.209 UNSPECIFIED OVARIAN CYST, UNSPECIFIED SIDE: Chronic | Status: ACTIVE | Noted: 2024-04-16

## 2024-04-24 PROBLEM — D25.9 LEIOMYOMA OF UTERUS, UNSPECIFIED: Chronic | Status: ACTIVE | Noted: 2024-04-16

## 2024-04-25 DIAGNOSIS — N93.9 ABNORMAL UTERINE AND VAGINAL BLEEDING, UNSPECIFIED: ICD-10-CM

## 2024-04-25 DIAGNOSIS — I10 ESSENTIAL (PRIMARY) HYPERTENSION: ICD-10-CM

## 2024-04-25 DIAGNOSIS — N84.0 POLYP OF CORPUS UTERI: ICD-10-CM

## 2024-04-25 RX ORDER — MEDROXYPROGESTERONE ACETATE 10 MG/1
10 TABLET ORAL DAILY
Qty: 30 | Refills: 3 | Status: ACTIVE | COMMUNITY
Start: 2024-04-25 | End: 1900-01-01

## 2024-05-06 ENCOUNTER — APPOINTMENT (OUTPATIENT)
Dept: OBGYN | Facility: CLINIC | Age: 44
End: 2024-05-06

## 2024-05-20 ENCOUNTER — OUTPATIENT (OUTPATIENT)
Dept: OUTPATIENT SERVICES | Facility: HOSPITAL | Age: 44
LOS: 1 days | End: 2024-05-20
Payer: MEDICAID

## 2024-05-20 ENCOUNTER — APPOINTMENT (OUTPATIENT)
Dept: OBGYN | Facility: CLINIC | Age: 44
End: 2024-05-20
Payer: MEDICAID

## 2024-05-20 VITALS
WEIGHT: 195 LBS | DIASTOLIC BLOOD PRESSURE: 77 MMHG | HEIGHT: 62 IN | SYSTOLIC BLOOD PRESSURE: 142 MMHG | BODY MASS INDEX: 35.88 KG/M2

## 2024-05-20 DIAGNOSIS — N93.9 ABNORMAL UTERINE AND VAGINAL BLEEDING, UNSPECIFIED: ICD-10-CM

## 2024-05-20 DIAGNOSIS — Z98.891 HISTORY OF UTERINE SCAR FROM PREVIOUS SURGERY: Chronic | ICD-10-CM

## 2024-05-20 DIAGNOSIS — Z98.890 OTHER SPECIFIED POSTPROCEDURAL STATES: Chronic | ICD-10-CM

## 2024-05-20 DIAGNOSIS — Z98.890 OTHER SPECIFIED POSTPROCEDURAL STATES: ICD-10-CM

## 2024-05-20 DIAGNOSIS — Z90.49 ACQUIRED ABSENCE OF OTHER SPECIFIED PARTS OF DIGESTIVE TRACT: Chronic | ICD-10-CM

## 2024-05-20 PROCEDURE — 99213 OFFICE O/P EST LOW 20 MIN: CPT

## 2024-05-20 RX ORDER — NORETHINDRONE 0.35 MG/1
0.35 TABLET ORAL
Qty: 3 | Refills: 3 | Status: ACTIVE | COMMUNITY
Start: 2024-05-20 | End: 1900-01-01

## 2024-05-20 NOTE — PLAN
[FreeTextEntry1] : Patient counselled about her options. Will start micronor. f/u 1 year for annual.  Mammo already ordered - patient will make an appointment.

## 2024-05-20 NOTE — HISTORY OF PRESENT ILLNESS
[FreeTextEntry1] : Patient here for f/u after D&C for heavy periods. She reports that she hasn't taken the progesterone and that she had a regular period. She is on labetalol for her blood pressure.

## 2024-05-22 DIAGNOSIS — N93.9 ABNORMAL UTERINE AND VAGINAL BLEEDING, UNSPECIFIED: ICD-10-CM

## 2024-06-14 ENCOUNTER — APPOINTMENT (OUTPATIENT)
Dept: CARDIOLOGY | Facility: CLINIC | Age: 44
End: 2024-06-14

## 2024-07-02 ENCOUNTER — OUTPATIENT (OUTPATIENT)
Dept: OUTPATIENT SERVICES | Facility: HOSPITAL | Age: 44
LOS: 1 days | End: 2024-07-02
Payer: MEDICAID

## 2024-07-02 ENCOUNTER — APPOINTMENT (OUTPATIENT)
Dept: OPHTHALMOLOGY | Facility: CLINIC | Age: 44
End: 2024-07-02
Payer: MEDICAID

## 2024-07-02 DIAGNOSIS — Z90.49 ACQUIRED ABSENCE OF OTHER SPECIFIED PARTS OF DIGESTIVE TRACT: Chronic | ICD-10-CM

## 2024-07-02 DIAGNOSIS — H53.8 OTHER VISUAL DISTURBANCES: ICD-10-CM

## 2024-07-02 PROCEDURE — 99213 OFFICE O/P EST LOW 20 MIN: CPT

## 2024-07-02 PROCEDURE — 99203 OFFICE O/P NEW LOW 30 MIN: CPT

## 2024-07-09 ENCOUNTER — APPOINTMENT (OUTPATIENT)
Dept: OPHTHALMOLOGY | Facility: CLINIC | Age: 44
End: 2024-07-09
Payer: MEDICAID

## 2024-07-09 ENCOUNTER — OUTPATIENT (OUTPATIENT)
Dept: OUTPATIENT SERVICES | Facility: HOSPITAL | Age: 44
LOS: 1 days | End: 2024-07-09
Payer: MEDICAID

## 2024-07-09 DIAGNOSIS — H10.13 ACUTE ATOPIC CONJUNCTIVITIS, BILATERAL: ICD-10-CM

## 2024-07-09 DIAGNOSIS — H53.8 OTHER VISUAL DISTURBANCES: ICD-10-CM

## 2024-07-09 DIAGNOSIS — H04.123 DRY EYE SYNDROME OF BILATERAL LACRIMAL GLANDS: ICD-10-CM

## 2024-07-09 DIAGNOSIS — Z98.891 HISTORY OF UTERINE SCAR FROM PREVIOUS SURGERY: Chronic | ICD-10-CM

## 2024-07-09 PROCEDURE — 99213 OFFICE O/P EST LOW 20 MIN: CPT

## 2024-07-10 ENCOUNTER — EMERGENCY (EMERGENCY)
Facility: HOSPITAL | Age: 44
LOS: 0 days | Discharge: HOME | End: 2024-07-11
Attending: EMERGENCY MEDICINE
Payer: MEDICAID

## 2024-07-10 VITALS
HEART RATE: 88 BPM | SYSTOLIC BLOOD PRESSURE: 146 MMHG | TEMPERATURE: 98 F | DIASTOLIC BLOOD PRESSURE: 87 MMHG | WEIGHT: 195.11 LBS | RESPIRATION RATE: 18 BRPM | OXYGEN SATURATION: 99 %

## 2024-07-10 VITALS
SYSTOLIC BLOOD PRESSURE: 163 MMHG | RESPIRATION RATE: 17 BRPM | HEART RATE: 82 BPM | TEMPERATURE: 98 F | DIASTOLIC BLOOD PRESSURE: 96 MMHG | OXYGEN SATURATION: 100 %

## 2024-07-10 DIAGNOSIS — Z90.49 ACQUIRED ABSENCE OF OTHER SPECIFIED PARTS OF DIGESTIVE TRACT: Chronic | ICD-10-CM

## 2024-07-10 DIAGNOSIS — D25.9 LEIOMYOMA OF UTERUS, UNSPECIFIED: ICD-10-CM

## 2024-07-10 DIAGNOSIS — Z98.891 HISTORY OF UTERINE SCAR FROM PREVIOUS SURGERY: Chronic | ICD-10-CM

## 2024-07-10 DIAGNOSIS — Z98.890 OTHER SPECIFIED POSTPROCEDURAL STATES: Chronic | ICD-10-CM

## 2024-07-10 DIAGNOSIS — Z91.018 ALLERGY TO OTHER FOODS: ICD-10-CM

## 2024-07-10 DIAGNOSIS — R10.9 UNSPECIFIED ABDOMINAL PAIN: ICD-10-CM

## 2024-07-10 DIAGNOSIS — Z90.49 ACQUIRED ABSENCE OF OTHER SPECIFIED PARTS OF DIGESTIVE TRACT: ICD-10-CM

## 2024-07-10 DIAGNOSIS — R10.30 LOWER ABDOMINAL PAIN, UNSPECIFIED: ICD-10-CM

## 2024-07-10 DIAGNOSIS — I10 ESSENTIAL (PRIMARY) HYPERTENSION: ICD-10-CM

## 2024-07-10 LAB
ALBUMIN SERPL ELPH-MCNC: 4.6 G/DL — SIGNIFICANT CHANGE UP (ref 3.5–5.2)
ALP SERPL-CCNC: 104 U/L — SIGNIFICANT CHANGE UP (ref 30–115)
ALT FLD-CCNC: 13 U/L — SIGNIFICANT CHANGE UP (ref 0–41)
ANION GAP SERPL CALC-SCNC: 13 MMOL/L — SIGNIFICANT CHANGE UP (ref 7–14)
APPEARANCE UR: CLEAR — SIGNIFICANT CHANGE UP
AST SERPL-CCNC: 17 U/L — SIGNIFICANT CHANGE UP (ref 0–41)
BASOPHILS # BLD AUTO: 0.05 K/UL — SIGNIFICANT CHANGE UP (ref 0–0.2)
BASOPHILS NFR BLD AUTO: 0.8 % — SIGNIFICANT CHANGE UP (ref 0–1)
BILIRUB SERPL-MCNC: <0.2 MG/DL — SIGNIFICANT CHANGE UP (ref 0.2–1.2)
BILIRUB UR-MCNC: NEGATIVE — SIGNIFICANT CHANGE UP
BUN SERPL-MCNC: 17 MG/DL — SIGNIFICANT CHANGE UP (ref 10–20)
CALCIUM SERPL-MCNC: 9.6 MG/DL — SIGNIFICANT CHANGE UP (ref 8.4–10.5)
CHLORIDE SERPL-SCNC: 105 MMOL/L — SIGNIFICANT CHANGE UP (ref 98–110)
CO2 SERPL-SCNC: 25 MMOL/L — SIGNIFICANT CHANGE UP (ref 17–32)
COLOR SPEC: YELLOW — SIGNIFICANT CHANGE UP
CREAT SERPL-MCNC: 0.9 MG/DL — SIGNIFICANT CHANGE UP (ref 0.7–1.5)
DIFF PNL FLD: NEGATIVE — SIGNIFICANT CHANGE UP
EGFR: 81 ML/MIN/1.73M2 — SIGNIFICANT CHANGE UP
EOSINOPHIL # BLD AUTO: 0.09 K/UL — SIGNIFICANT CHANGE UP (ref 0–0.7)
EOSINOPHIL NFR BLD AUTO: 1.4 % — SIGNIFICANT CHANGE UP (ref 0–8)
GLUCOSE SERPL-MCNC: 104 MG/DL — HIGH (ref 70–99)
GLUCOSE UR QL: NEGATIVE MG/DL — SIGNIFICANT CHANGE UP
HCG SERPL QL: NEGATIVE — SIGNIFICANT CHANGE UP
HCT VFR BLD CALC: 37.7 % — SIGNIFICANT CHANGE UP (ref 37–47)
HGB BLD-MCNC: 11.8 G/DL — LOW (ref 12–16)
IMM GRANULOCYTES NFR BLD AUTO: 0.3 % — SIGNIFICANT CHANGE UP (ref 0.1–0.3)
KETONES UR-MCNC: NEGATIVE MG/DL — SIGNIFICANT CHANGE UP
LACTATE SERPL-SCNC: 0.7 MMOL/L — SIGNIFICANT CHANGE UP (ref 0.7–2)
LEUKOCYTE ESTERASE UR-ACNC: NEGATIVE — SIGNIFICANT CHANGE UP
LIDOCAIN IGE QN: 47 U/L — SIGNIFICANT CHANGE UP (ref 7–60)
LYMPHOCYTES # BLD AUTO: 2.49 K/UL — SIGNIFICANT CHANGE UP (ref 1.2–3.4)
LYMPHOCYTES # BLD AUTO: 39.7 % — SIGNIFICANT CHANGE UP (ref 20.5–51.1)
MCHC RBC-ENTMCNC: 25 PG — LOW (ref 27–31)
MCHC RBC-ENTMCNC: 31.3 G/DL — LOW (ref 32–37)
MCV RBC AUTO: 79.9 FL — LOW (ref 81–99)
MONOCYTES # BLD AUTO: 0.57 K/UL — SIGNIFICANT CHANGE UP (ref 0.1–0.6)
MONOCYTES NFR BLD AUTO: 9.1 % — SIGNIFICANT CHANGE UP (ref 1.7–9.3)
NEUTROPHILS # BLD AUTO: 3.05 K/UL — SIGNIFICANT CHANGE UP (ref 1.4–6.5)
NEUTROPHILS NFR BLD AUTO: 48.7 % — SIGNIFICANT CHANGE UP (ref 42.2–75.2)
NITRITE UR-MCNC: NEGATIVE — SIGNIFICANT CHANGE UP
NRBC # BLD: 0 /100 WBCS — SIGNIFICANT CHANGE UP (ref 0–0)
PH UR: 5.5 — SIGNIFICANT CHANGE UP (ref 5–8)
PLATELET # BLD AUTO: 309 K/UL — SIGNIFICANT CHANGE UP (ref 130–400)
PMV BLD: 9.2 FL — SIGNIFICANT CHANGE UP (ref 7.4–10.4)
POTASSIUM SERPL-MCNC: 4 MMOL/L — SIGNIFICANT CHANGE UP (ref 3.5–5)
POTASSIUM SERPL-SCNC: 4 MMOL/L — SIGNIFICANT CHANGE UP (ref 3.5–5)
PROT SERPL-MCNC: 7.4 G/DL — SIGNIFICANT CHANGE UP (ref 6–8)
PROT UR-MCNC: SIGNIFICANT CHANGE UP MG/DL
RBC # BLD: 4.72 M/UL — SIGNIFICANT CHANGE UP (ref 4.2–5.4)
RBC # FLD: 13.5 % — SIGNIFICANT CHANGE UP (ref 11.5–14.5)
SODIUM SERPL-SCNC: 143 MMOL/L — SIGNIFICANT CHANGE UP (ref 135–146)
SP GR SPEC: >1.03 — HIGH (ref 1–1.03)
UROBILINOGEN FLD QL: 1 MG/DL — SIGNIFICANT CHANGE UP (ref 0.2–1)
WBC # BLD: 6.27 K/UL — SIGNIFICANT CHANGE UP (ref 4.8–10.8)
WBC # FLD AUTO: 6.27 K/UL — SIGNIFICANT CHANGE UP (ref 4.8–10.8)

## 2024-07-10 PROCEDURE — 76856 US EXAM PELVIC COMPLETE: CPT | Mod: 26

## 2024-07-10 PROCEDURE — 76856 US EXAM PELVIC COMPLETE: CPT

## 2024-07-10 PROCEDURE — 80053 COMPREHEN METABOLIC PANEL: CPT

## 2024-07-10 PROCEDURE — 99284 EMERGENCY DEPT VISIT MOD MDM: CPT | Mod: 25

## 2024-07-10 PROCEDURE — 74177 CT ABD & PELVIS W/CONTRAST: CPT | Mod: 26,MC

## 2024-07-10 PROCEDURE — 36415 COLL VENOUS BLD VENIPUNCTURE: CPT

## 2024-07-10 PROCEDURE — 83690 ASSAY OF LIPASE: CPT

## 2024-07-10 PROCEDURE — 84703 CHORIONIC GONADOTROPIN ASSAY: CPT

## 2024-07-10 PROCEDURE — 85025 COMPLETE CBC W/AUTO DIFF WBC: CPT

## 2024-07-10 PROCEDURE — 99285 EMERGENCY DEPT VISIT HI MDM: CPT

## 2024-07-10 PROCEDURE — 83605 ASSAY OF LACTIC ACID: CPT

## 2024-07-10 PROCEDURE — 74177 CT ABD & PELVIS W/CONTRAST: CPT | Mod: MC

## 2024-07-10 PROCEDURE — 81003 URINALYSIS AUTO W/O SCOPE: CPT

## 2024-07-10 RX ORDER — DEXTROSE MONOHYDRATE AND SODIUM CHLORIDE 5; .3 G/100ML; G/100ML
2000 INJECTION, SOLUTION INTRAVENOUS ONCE
Refills: 0 | Status: DISCONTINUED | OUTPATIENT
Start: 2024-07-10 | End: 2024-07-11

## 2024-07-23 ENCOUNTER — APPOINTMENT (OUTPATIENT)
Dept: OPHTHALMOLOGY | Facility: CLINIC | Age: 44
End: 2024-07-23
Payer: MEDICAID

## 2024-07-23 PROCEDURE — 99213 OFFICE O/P EST LOW 20 MIN: CPT

## 2024-08-02 ENCOUNTER — OUTPATIENT (OUTPATIENT)
Dept: OUTPATIENT SERVICES | Facility: HOSPITAL | Age: 44
LOS: 1 days | End: 2024-08-02
Payer: MEDICAID

## 2024-08-02 ENCOUNTER — APPOINTMENT (OUTPATIENT)
Dept: GASTROENTEROLOGY | Facility: CLINIC | Age: 44
End: 2024-08-02
Payer: MEDICAID

## 2024-08-02 VITALS
BODY MASS INDEX: 36.8 KG/M2 | OXYGEN SATURATION: 99 % | DIASTOLIC BLOOD PRESSURE: 89 MMHG | SYSTOLIC BLOOD PRESSURE: 129 MMHG | WEIGHT: 200 LBS | HEIGHT: 62 IN | HEART RATE: 83 BPM

## 2024-08-02 DIAGNOSIS — K21.9 GASTRO-ESOPHAGEAL REFLUX DISEASE WITHOUT ESOPHAGITIS: ICD-10-CM

## 2024-08-02 DIAGNOSIS — D64.9 ANEMIA, UNSPECIFIED: ICD-10-CM

## 2024-08-02 DIAGNOSIS — K21.9 GASTRO-ESOPHAGEAL REFLUX DISEASE W/OUT ESOPHAGITIS: ICD-10-CM

## 2024-08-02 DIAGNOSIS — Z90.49 ACQUIRED ABSENCE OF OTHER SPECIFIED PARTS OF DIGESTIVE TRACT: Chronic | ICD-10-CM

## 2024-08-02 DIAGNOSIS — Z86.79 PERSONAL HISTORY OF OTHER DISEASES OF THE CIRCULATORY SYSTEM: ICD-10-CM

## 2024-08-02 DIAGNOSIS — R10.11 RIGHT UPPER QUADRANT PAIN: ICD-10-CM

## 2024-08-02 DIAGNOSIS — Z98.891 HISTORY OF UTERINE SCAR FROM PREVIOUS SURGERY: Chronic | ICD-10-CM

## 2024-08-02 DIAGNOSIS — Z80.41 FAMILY HISTORY OF MALIGNANT NEOPLASM OF OVARY: ICD-10-CM

## 2024-08-02 DIAGNOSIS — Z98.890 OTHER SPECIFIED POSTPROCEDURAL STATES: Chronic | ICD-10-CM

## 2024-08-02 DIAGNOSIS — Z00.00 ENCOUNTER FOR GENERAL ADULT MEDICAL EXAMINATION WITHOUT ABNORMAL FINDINGS: ICD-10-CM

## 2024-08-02 PROCEDURE — 99214 OFFICE O/P EST MOD 30 MIN: CPT

## 2024-08-02 RX ORDER — PANTOPRAZOLE 40 MG/1
40 TABLET, DELAYED RELEASE ORAL DAILY
Qty: 90 | Refills: 0 | Status: ACTIVE | COMMUNITY
Start: 2024-08-02 | End: 1900-01-01

## 2024-08-02 RX ORDER — POLYETHYLENE GLYCOL 3350 AND ELECTROLYTES WITH LEMON FLAVOR 236; 22.74; 6.74; 5.86; 2.97 G/4L; G/4L; G/4L; G/4L; G/4L
236 POWDER, FOR SOLUTION ORAL
Qty: 1 | Refills: 0 | Status: ACTIVE | COMMUNITY
Start: 2024-08-02 | End: 1900-01-01

## 2024-08-02 NOTE — REASON FOR VISIT
Chief Complaint   Patient presents with    Well Child     2 week Room # 6      Learning Assessment 2019   PRIMARY LEARNER Patient   HIGHEST LEVEL OF EDUCATION - PRIMARY LEARNER  DID NOT GRADUATE HIGH SCHOOL   BARRIERS PRIMARY LEARNER NONE   CO-LEARNER CAREGIVER Yes   CO-LEARNER NAME mother   [address]   PRIMARY LANGUAGE ENGLISH   PRIMARY LANGUAGE CO-LEARNER ENGLISH    NEED No   LEARNER PREFERENCE PRIMARY DEMONSTRATION   LEARNER PREFERENCE CO-LEARNER DEMONSTRATION   LEARNING SPECIAL TOPICS no   ANSWERED BY mother   RELATIONSHIP LEGAL GUARDIAN     Abuse Screening 2019   Are there any signs of abuse or neglect?  No [Follow-up] : a follow-up of an existing diagnosis

## 2024-08-04 NOTE — ASSESSMENT
[FreeTextEntry1] : 44 year old female with PMH of fibroids, ovarian cyst, HTN, Microcytic anemia, IBS and obesity presenting today for follow up after ER admission on 7/10/24 for R sided abdominal pain.   # R sided abdominal pain - CTAP was negative - Pain is achy and dull, 6/10, non radiating. Not associated with any particular food and no change in diet. The pain is worse when bending over when she is having one of her attacks. Not associated with any vomiting/diarrhea/constipation/melena/hematemesis/hematochezia. No fevers, SOB, Chest pain. Plan: -Colonoscopy/EGD , risks and benefits discussed and patient is agreeable  -RUQ U/S - PPI  #GERD - PPI

## 2024-08-04 NOTE — PHYSICAL EXAM
[Alert] : alert [Normal Voice/Communication] : normal voice/communication [Healthy Appearing] : healthy appearing [No Acute Distress] : no acute distress [Sclera] : the sclera and conjunctiva were normal [Normal Appearance] : the appearance of the neck was normal [No Neck Mass] : no neck mass was observed [No Respiratory Distress] : no respiratory distress [No Acc Muscle Use] : no accessory muscle use [Heart Rate And Rhythm] : heart rate was normal and rhythm regular [Normal S1, S2] : normal S1 and S2 [Murmurs] : no murmurs [Bowel Sounds] : normal bowel sounds [Abdomen Tenderness] : non-tender [No Masses] : no abdominal mass palpated [Abdomen Soft] : soft [No CVA Tenderness] : no CVA  tenderness [Oriented To Time, Place, And Person] : oriented to person, place, and time

## 2024-08-04 NOTE — HISTORY OF PRESENT ILLNESS
[FreeTextEntry1] : 44 year old female with PMH of fibroids, ovarian cyst, HTN, Microcytic anemia, IBS and obesity presenting today for follow up after ER admission on 7/10/24 for R sided abdominal pain. CTAP and pelvic US were all negative and the patient was discharged. The pain subsided on its own after 4 days.  Two days ago the pain returned with no relief on GasX. After 24 hours, the pain subsided on its own. Pain is achy and dull, 6/10, non radiating. Not associated with any particular food and no change in diet. The pain is worse when bending over when she is having one of her attacks. Not associated with any vomiting/diarrhea/constipation/melena/hematemesis/hematochezia. No fevers, SOB, Chest pain. Denies normal BM of 1 time a day and normal caliber and color and not floating.   No family history of Grandmother- ovarian cancer Grandfather- Ulcerative colitis

## 2024-09-23 ENCOUNTER — APPOINTMENT (OUTPATIENT)
Dept: OPHTHALMOLOGY | Facility: CLINIC | Age: 44
End: 2024-09-23

## 2024-09-26 ENCOUNTER — EMERGENCY (EMERGENCY)
Facility: HOSPITAL | Age: 44
LOS: 0 days | Discharge: HOME | End: 2024-09-26
Attending: STUDENT IN AN ORGANIZED HEALTH CARE EDUCATION/TRAINING PROGRAM
Payer: MEDICAID

## 2024-09-26 VITALS
WEIGHT: 199.96 LBS | RESPIRATION RATE: 18 BRPM | SYSTOLIC BLOOD PRESSURE: 175 MMHG | OXYGEN SATURATION: 98 % | HEIGHT: 62 IN | HEART RATE: 92 BPM | DIASTOLIC BLOOD PRESSURE: 111 MMHG | TEMPERATURE: 98 F

## 2024-09-26 VITALS — SYSTOLIC BLOOD PRESSURE: 151 MMHG | DIASTOLIC BLOOD PRESSURE: 84 MMHG

## 2024-09-26 DIAGNOSIS — Z98.891 HISTORY OF UTERINE SCAR FROM PREVIOUS SURGERY: Chronic | ICD-10-CM

## 2024-09-26 DIAGNOSIS — Z98.890 OTHER SPECIFIED POSTPROCEDURAL STATES: Chronic | ICD-10-CM

## 2024-09-26 DIAGNOSIS — Z90.49 ACQUIRED ABSENCE OF OTHER SPECIFIED PARTS OF DIGESTIVE TRACT: Chronic | ICD-10-CM

## 2024-09-26 LAB
ALBUMIN SERPL ELPH-MCNC: 4.6 G/DL — SIGNIFICANT CHANGE UP (ref 3.5–5.2)
ALP SERPL-CCNC: 114 U/L — SIGNIFICANT CHANGE UP (ref 30–115)
ALT FLD-CCNC: 18 U/L — SIGNIFICANT CHANGE UP (ref 0–41)
ANION GAP SERPL CALC-SCNC: 11 MMOL/L — SIGNIFICANT CHANGE UP (ref 7–14)
AST SERPL-CCNC: 20 U/L — SIGNIFICANT CHANGE UP (ref 0–41)
BASOPHILS # BLD AUTO: 0.05 K/UL — SIGNIFICANT CHANGE UP (ref 0–0.2)
BASOPHILS NFR BLD AUTO: 0.6 % — SIGNIFICANT CHANGE UP (ref 0–1)
BILIRUB SERPL-MCNC: 0.2 MG/DL — SIGNIFICANT CHANGE UP (ref 0.2–1.2)
BUN SERPL-MCNC: 17 MG/DL — SIGNIFICANT CHANGE UP (ref 10–20)
CALCIUM SERPL-MCNC: 9.2 MG/DL — SIGNIFICANT CHANGE UP (ref 8.4–10.5)
CHLORIDE SERPL-SCNC: 100 MMOL/L — SIGNIFICANT CHANGE UP (ref 98–110)
CO2 SERPL-SCNC: 26 MMOL/L — SIGNIFICANT CHANGE UP (ref 17–32)
CREAT SERPL-MCNC: 1 MG/DL — SIGNIFICANT CHANGE UP (ref 0.7–1.5)
D DIMER BLD IA.RAPID-MCNC: 158 NG/ML DDU — SIGNIFICANT CHANGE UP
EGFR: 71 ML/MIN/1.73M2 — SIGNIFICANT CHANGE UP
EOSINOPHIL # BLD AUTO: 0.09 K/UL — SIGNIFICANT CHANGE UP (ref 0–0.7)
EOSINOPHIL NFR BLD AUTO: 1.1 % — SIGNIFICANT CHANGE UP (ref 0–8)
GLUCOSE SERPL-MCNC: 99 MG/DL — SIGNIFICANT CHANGE UP (ref 70–99)
HCG SERPL QL: NEGATIVE — SIGNIFICANT CHANGE UP
HCT VFR BLD CALC: 37.1 % — SIGNIFICANT CHANGE UP (ref 37–47)
HGB BLD-MCNC: 12 G/DL — SIGNIFICANT CHANGE UP (ref 12–16)
IMM GRANULOCYTES NFR BLD AUTO: 0.5 % — HIGH (ref 0.1–0.3)
LYMPHOCYTES # BLD AUTO: 2.39 K/UL — SIGNIFICANT CHANGE UP (ref 1.2–3.4)
LYMPHOCYTES # BLD AUTO: 27.9 % — SIGNIFICANT CHANGE UP (ref 20.5–51.1)
MAGNESIUM SERPL-MCNC: 1.9 MG/DL — SIGNIFICANT CHANGE UP (ref 1.8–2.4)
MCHC RBC-ENTMCNC: 25.4 PG — LOW (ref 27–31)
MCHC RBC-ENTMCNC: 32.3 G/DL — SIGNIFICANT CHANGE UP (ref 32–37)
MCV RBC AUTO: 78.6 FL — LOW (ref 81–99)
MONOCYTES # BLD AUTO: 0.61 K/UL — HIGH (ref 0.1–0.6)
MONOCYTES NFR BLD AUTO: 7.1 % — SIGNIFICANT CHANGE UP (ref 1.7–9.3)
NEUTROPHILS # BLD AUTO: 5.38 K/UL — SIGNIFICANT CHANGE UP (ref 1.4–6.5)
NEUTROPHILS NFR BLD AUTO: 62.8 % — SIGNIFICANT CHANGE UP (ref 42.2–75.2)
NRBC # BLD: 0 /100 WBCS — SIGNIFICANT CHANGE UP (ref 0–0)
PLATELET # BLD AUTO: 289 K/UL — SIGNIFICANT CHANGE UP (ref 130–400)
PMV BLD: 9.5 FL — SIGNIFICANT CHANGE UP (ref 7.4–10.4)
POTASSIUM SERPL-MCNC: 4 MMOL/L — SIGNIFICANT CHANGE UP (ref 3.5–5)
POTASSIUM SERPL-SCNC: 4 MMOL/L — SIGNIFICANT CHANGE UP (ref 3.5–5)
PROT SERPL-MCNC: 7.2 G/DL — SIGNIFICANT CHANGE UP (ref 6–8)
RBC # BLD: 4.72 M/UL — SIGNIFICANT CHANGE UP (ref 4.2–5.4)
RBC # FLD: 14 % — SIGNIFICANT CHANGE UP (ref 11.5–14.5)
SODIUM SERPL-SCNC: 137 MMOL/L — SIGNIFICANT CHANGE UP (ref 135–146)
TROPONIN T, HIGH SENSITIVITY RESULT: <6 NG/L — SIGNIFICANT CHANGE UP (ref 6–13)
WBC # BLD: 8.56 K/UL — SIGNIFICANT CHANGE UP (ref 4.8–10.8)
WBC # FLD AUTO: 8.56 K/UL — SIGNIFICANT CHANGE UP (ref 4.8–10.8)

## 2024-09-26 PROCEDURE — 71045 X-RAY EXAM CHEST 1 VIEW: CPT | Mod: 26

## 2024-09-26 PROCEDURE — 83735 ASSAY OF MAGNESIUM: CPT

## 2024-09-26 PROCEDURE — 36415 COLL VENOUS BLD VENIPUNCTURE: CPT

## 2024-09-26 PROCEDURE — 93010 ELECTROCARDIOGRAM REPORT: CPT

## 2024-09-26 PROCEDURE — 93005 ELECTROCARDIOGRAM TRACING: CPT

## 2024-09-26 PROCEDURE — 85379 FIBRIN DEGRADATION QUANT: CPT

## 2024-09-26 PROCEDURE — 99285 EMERGENCY DEPT VISIT HI MDM: CPT

## 2024-09-26 PROCEDURE — 85025 COMPLETE CBC W/AUTO DIFF WBC: CPT

## 2024-09-26 PROCEDURE — 71045 X-RAY EXAM CHEST 1 VIEW: CPT

## 2024-09-26 PROCEDURE — 36000 PLACE NEEDLE IN VEIN: CPT

## 2024-09-26 PROCEDURE — 84484 ASSAY OF TROPONIN QUANT: CPT

## 2024-09-26 PROCEDURE — 99285 EMERGENCY DEPT VISIT HI MDM: CPT | Mod: 25

## 2024-09-26 PROCEDURE — 80053 COMPREHEN METABOLIC PANEL: CPT

## 2024-09-26 PROCEDURE — 84703 CHORIONIC GONADOTROPIN ASSAY: CPT

## 2024-09-26 RX ORDER — SODIUM CHLORIDE 9 MG/ML
1000 INJECTION INTRAMUSCULAR; INTRAVENOUS; SUBCUTANEOUS ONCE
Refills: 0 | Status: COMPLETED | OUTPATIENT
Start: 2024-09-26 | End: 2024-09-26

## 2024-09-26 RX ADMIN — SODIUM CHLORIDE 1000 MILLILITER(S): 9 INJECTION INTRAMUSCULAR; INTRAVENOUS; SUBCUTANEOUS at 20:01

## 2024-09-26 NOTE — ED PROVIDER NOTE - NSFOLLOWUPINSTRUCTIONS_ED_ALL_ED_FT
Follow-up with Dr. Curtis within 1-2 weeks to arrange adjustment to your blood pressure medications.    Hypertension    Hypertension, commonly called high blood pressure, is when the force of blood pumping through your arteries is too strong. Hypertension forces your heart to work harder to pump blood. Your arteries may become narrow or stiff. Having untreated or uncontrolled hypertension for a long period of time can cause heart attack, stroke, kidney disease, and other problems. If started on a medication, take exactly as prescribed by your health care professional. Maintain a healthy lifestyle and follow up with your primary care physician.    SEEK IMMEDIATE MEDICAL CARE IF YOU HAVE ANY OF THE FOLLOWING SYMPTOMS: severe headache, confusion, chest pain, abdominal pain, vomiting, or shortness of breath.

## 2024-09-26 NOTE — ED PROVIDER NOTE - IN ACCORDANCE WITH [place] STATE LAW, WE OFFER EVERY PATIENT A HEPATITIS C TEST. WOULD YOU LIKE TO BE TESTED TODAY?
Chief Complaint   Patient presents with     Blood Draw     Labs only     Vitals done, labs drawn by venipuncture.  See doc flow sheets for details.  Lola Moreno CMA     Opt out

## 2024-09-26 NOTE — ED PROVIDER NOTE - CLINICAL SUMMARY MEDICAL DECISION MAKING FREE TEXT BOX
44-year-old female with past medical history HTN on labetalol 100 mg twice daily and olmesartan 40 mg daily (states is compliant with medications) presents with complaints of elevated blood pressure and palpitations x few days. denies chest pain or sob. denies fevers. has had recent labs including thyroid function which were normal per patient. denies missing any doses of her medication. no LE edema. ekg independently interpreted by me Dr. Hood showing NSR, no stemi. labs including d-dimer and trop were undetectable/normal. xray chest independently interpreted by me Dr. Hood showing no focal opacities. patient has had work up for palpitations in the past including holter monitor without definite arrhthymias. no light-headedness/syncope at this time. stable for dc with close follow up with her cardiologist for further work up. Strict return precautions discussed including signs of end organ damage. Instructed patient to keep BP diary and with them for close follow up with PCP for medication management.

## 2024-09-26 NOTE — ED ADULT TRIAGE NOTE - CHIEF COMPLAINT QUOTE
Hypertensive at  home took labetalol & olmesartan, endorses palpitations. Denies headache, denies chest pain  EKG done in triage

## 2024-09-26 NOTE — ED ADULT NURSE NOTE - NSFALLUNIVINTERV_ED_ALL_ED
Monitor gait and stability/Bed/Stretcher in lowest position, wheels locked, appropriate side rails in place/Call bell, personal items and telephone in reach/Instruct patient to call for assistance before getting out of bed/chair/stretcher/Non-slip footwear applied when patient is off stretcher/[place] to call system/Physically safe environment - no spills, clutter or unnecessary equipment/Purposeful proactive rounding/Room/bathroom lighting operational, light cord in reach

## 2024-09-26 NOTE — ED PROVIDER NOTE - OBJECTIVE STATEMENT
44-year-old female with past medical history HTN on labetalol 100 mg twice daily and olmesartan 40 mg daily (states is compliant with medications) presents with complaints of elevated blood pressure and palpitations.  Reports her blood pressure has been elevated over the last several days and she has called her physician that prescribes her the medications Dr. Curtis, who states that they do not have an appointment for her in the near future.  States that over the last 2 days she has had intermittent chest fluttering sensation as well as "jitteriness" and palpitations.  Denies fever/chills, chest pain, vision change, shortness of breath, abdominal pain, nausea/vomiting/diarrhea, change in bowel/bladder habits, hematuria, lightheadedness, dizziness, focal numbness/weakness.

## 2024-09-26 NOTE — ED PROVIDER NOTE - PATIENT PORTAL LINK FT
You can access the FollowMyHealth Patient Portal offered by [facility] by registering at the following website: [email or web address]. By joining [facility]’s FollowMyHealth portal, you will also be able to view your health information using other applications (apps) compatible with our system.

## 2024-10-04 ENCOUNTER — APPOINTMENT (OUTPATIENT)
Dept: CARDIOLOGY | Facility: CLINIC | Age: 44
End: 2024-10-04
Payer: MEDICAID

## 2024-10-04 VITALS
HEART RATE: 79 BPM | BODY MASS INDEX: 37.36 KG/M2 | SYSTOLIC BLOOD PRESSURE: 124 MMHG | HEIGHT: 62 IN | WEIGHT: 203 LBS | DIASTOLIC BLOOD PRESSURE: 88 MMHG | OXYGEN SATURATION: 99 %

## 2024-10-04 VITALS — SYSTOLIC BLOOD PRESSURE: 144 MMHG | DIASTOLIC BLOOD PRESSURE: 100 MMHG

## 2024-10-04 DIAGNOSIS — R00.2 PALPITATIONS: ICD-10-CM

## 2024-10-04 DIAGNOSIS — I10 ESSENTIAL (PRIMARY) HYPERTENSION: ICD-10-CM

## 2024-10-04 PROCEDURE — 99214 OFFICE O/P EST MOD 30 MIN: CPT | Mod: 25

## 2024-10-04 PROCEDURE — 93000 ELECTROCARDIOGRAM COMPLETE: CPT

## 2024-10-04 RX ORDER — NICOTINE POLACRILEX 2 MG
0.012-0.2 GUM BUCCAL
Refills: 0 | Status: ACTIVE | COMMUNITY

## 2024-10-04 RX ORDER — OLMESARTAN MEDOXOMIL 40 MG/1
40 TABLET, FILM COATED ORAL
Refills: 0 | Status: ACTIVE | COMMUNITY

## 2024-10-04 NOTE — HISTORY OF PRESENT ILLNESS
[FreeTextEntry1] : The patient has had recent increase in BP and she was having palpitations . Cardiac enzymes were negative and ECG was normal . She was discharged ; She has had increased in her BP since that time . She does feel palpitations which sound like extrasystoles .

## 2024-10-04 NOTE — CARDIOLOGY SUMMARY
[___] : [unfilled] [de-identified] : 3- NSR Short Pr interval otherwise normal  10-4-2024 NSR Normal ECG .

## 2024-10-04 NOTE — ASSESSMENT
[FreeTextEntry1] : The patient has had had increase in BP . Secondary cause work up last year was negative including RA doppler. She had a EPATCH in the past which showed PVC's/.  She has not been sleeping well and she snores so needs to be worked up for DARREL as well. The patient has had no chest pain or SOB . She did have PAC's on EPA on 12-1-2024 . She did go to the ER and was released with normal Troponin

## 2024-10-04 NOTE — REVIEW OF SYSTEMS
[Palpitations] : palpitations [Negative] : Neurological [SOB] : no shortness of breath [Dyspnea on exertion] : not dyspnea during exertion [Chest Discomfort] : no chest discomfort

## 2024-10-18 ENCOUNTER — APPOINTMENT (OUTPATIENT)
Dept: CARDIOLOGY | Facility: CLINIC | Age: 44
End: 2024-10-18
Payer: MEDICAID

## 2024-10-18 VITALS
BODY MASS INDEX: 37.36 KG/M2 | SYSTOLIC BLOOD PRESSURE: 126 MMHG | WEIGHT: 203 LBS | DIASTOLIC BLOOD PRESSURE: 74 MMHG | HEIGHT: 62 IN

## 2024-10-18 DIAGNOSIS — I49.3 VENTRICULAR PREMATURE DEPOLARIZATION: ICD-10-CM

## 2024-10-18 DIAGNOSIS — R00.2 PALPITATIONS: ICD-10-CM

## 2024-10-18 PROCEDURE — 99214 OFFICE O/P EST MOD 30 MIN: CPT

## 2024-11-05 ENCOUNTER — LABORATORY RESULT (OUTPATIENT)
Age: 44
End: 2024-11-05

## 2024-11-05 ENCOUNTER — APPOINTMENT (OUTPATIENT)
Dept: UROGYNECOLOGY | Facility: CLINIC | Age: 44
End: 2024-11-05
Payer: MEDICAID

## 2024-11-05 VITALS
BODY MASS INDEX: 37.36 KG/M2 | SYSTOLIC BLOOD PRESSURE: 136 MMHG | HEART RATE: 83 BPM | DIASTOLIC BLOOD PRESSURE: 84 MMHG | WEIGHT: 203 LBS | HEIGHT: 62 IN

## 2024-11-05 DIAGNOSIS — R10.11 RIGHT UPPER QUADRANT PAIN: ICD-10-CM

## 2024-11-05 DIAGNOSIS — Z87.898 PERSONAL HISTORY OF OTHER SPECIFIED CONDITIONS: ICD-10-CM

## 2024-11-05 DIAGNOSIS — R10.2 PELVIC AND PERINEAL PAIN: ICD-10-CM

## 2024-11-05 DIAGNOSIS — R35.0 FREQUENCY OF MICTURITION: ICD-10-CM

## 2024-11-05 DIAGNOSIS — Z30.9 ENCOUNTER FOR CONTRACEPTIVE MANAGEMENT, UNSPECIFIED: ICD-10-CM

## 2024-11-05 DIAGNOSIS — Z86.19 PERSONAL HISTORY OF OTHER INFECTIOUS AND PARASITIC DISEASES: ICD-10-CM

## 2024-11-05 DIAGNOSIS — Z87.42 PERSONAL HISTORY OF OTHER DISEASES OF THE FEMALE GENITAL TRACT: ICD-10-CM

## 2024-11-05 DIAGNOSIS — R92.30 DENSE BREASTS, UNSPECIFIED: ICD-10-CM

## 2024-11-05 DIAGNOSIS — R51.9 HEADACHE, UNSPECIFIED: ICD-10-CM

## 2024-11-05 DIAGNOSIS — Z01.419 ENCOUNTER FOR GYNECOLOGICAL EXAMINATION (GENERAL) (ROUTINE) W/OUT ABNORMAL FINDINGS: ICD-10-CM

## 2024-11-05 DIAGNOSIS — N64.4 MASTODYNIA: ICD-10-CM

## 2024-11-05 DIAGNOSIS — Z86.79 PERSONAL HISTORY OF OTHER DISEASES OF THE CIRCULATORY SYSTEM: ICD-10-CM

## 2024-11-05 DIAGNOSIS — Z78.9 OTHER SPECIFIED HEALTH STATUS: ICD-10-CM

## 2024-11-05 PROCEDURE — 99205 OFFICE O/P NEW HI 60 MIN: CPT | Mod: 25

## 2024-11-05 PROCEDURE — 99459 PELVIC EXAMINATION: CPT

## 2024-11-05 PROCEDURE — 51701 INSERT BLADDER CATHETER: CPT

## 2024-11-08 LAB — URINE CULTURE <10: NORMAL

## 2024-11-21 ENCOUNTER — APPOINTMENT (OUTPATIENT)
Dept: CARDIOLOGY | Facility: CLINIC | Age: 44
End: 2024-11-21
Payer: MEDICAID

## 2024-11-21 DIAGNOSIS — D64.9 ANEMIA, UNSPECIFIED: ICD-10-CM

## 2024-11-21 DIAGNOSIS — I10 ESSENTIAL (PRIMARY) HYPERTENSION: ICD-10-CM

## 2024-11-21 PROCEDURE — 93244 EXT ECG>48HR<7D REV&INTERPJ: CPT

## 2024-12-09 DIAGNOSIS — Z00.00 ENCOUNTER FOR GENERAL ADULT MEDICAL EXAMINATION W/OUT ABNORMAL FINDINGS: ICD-10-CM

## 2024-12-17 ENCOUNTER — OUTPATIENT (OUTPATIENT)
Dept: OUTPATIENT SERVICES | Facility: HOSPITAL | Age: 44
LOS: 1 days | End: 2024-12-17
Payer: MEDICAID

## 2024-12-17 ENCOUNTER — RESULT REVIEW (OUTPATIENT)
Age: 44
End: 2024-12-17

## 2024-12-17 DIAGNOSIS — Z98.890 OTHER SPECIFIED POSTPROCEDURAL STATES: Chronic | ICD-10-CM

## 2024-12-17 DIAGNOSIS — Z98.891 HISTORY OF UTERINE SCAR FROM PREVIOUS SURGERY: Chronic | ICD-10-CM

## 2024-12-17 DIAGNOSIS — Z90.49 ACQUIRED ABSENCE OF OTHER SPECIFIED PARTS OF DIGESTIVE TRACT: Chronic | ICD-10-CM

## 2024-12-17 DIAGNOSIS — Z12.31 ENCOUNTER FOR SCREENING MAMMOGRAM FOR MALIGNANT NEOPLASM OF BREAST: ICD-10-CM

## 2024-12-17 DIAGNOSIS — N64.4 MASTODYNIA: ICD-10-CM

## 2024-12-17 PROCEDURE — 77066 DX MAMMO INCL CAD BI: CPT | Mod: 26

## 2024-12-17 PROCEDURE — 76642 ULTRASOUND BREAST LIMITED: CPT | Mod: LT

## 2024-12-17 PROCEDURE — 77066 DX MAMMO INCL CAD BI: CPT

## 2024-12-17 PROCEDURE — G0279: CPT

## 2024-12-17 PROCEDURE — 77062 BREAST TOMOSYNTHESIS BI: CPT | Mod: 26

## 2024-12-17 PROCEDURE — 76642 ULTRASOUND BREAST LIMITED: CPT | Mod: 26,LT

## 2024-12-18 DIAGNOSIS — N64.4 MASTODYNIA: ICD-10-CM

## 2024-12-26 ENCOUNTER — APPOINTMENT (OUTPATIENT)
Dept: UROGYNECOLOGY | Facility: CLINIC | Age: 44
End: 2024-12-26

## 2024-12-27 ENCOUNTER — APPOINTMENT (OUTPATIENT)
Dept: CARDIOLOGY | Facility: CLINIC | Age: 44
End: 2024-12-27

## 2024-12-27 PROCEDURE — 93306 TTE W/DOPPLER COMPLETE: CPT

## 2025-01-16 ENCOUNTER — APPOINTMENT (OUTPATIENT)
Dept: CARDIOLOGY | Facility: CLINIC | Age: 45
End: 2025-01-16
Payer: MEDICAID

## 2025-01-16 VITALS
HEIGHT: 62 IN | DIASTOLIC BLOOD PRESSURE: 80 MMHG | HEART RATE: 67 BPM | BODY MASS INDEX: 37.73 KG/M2 | SYSTOLIC BLOOD PRESSURE: 130 MMHG | WEIGHT: 205 LBS

## 2025-01-16 DIAGNOSIS — I49.3 VENTRICULAR PREMATURE DEPOLARIZATION: ICD-10-CM

## 2025-01-16 DIAGNOSIS — I10 ESSENTIAL (PRIMARY) HYPERTENSION: ICD-10-CM

## 2025-01-16 PROCEDURE — 93000 ELECTROCARDIOGRAM COMPLETE: CPT

## 2025-01-16 PROCEDURE — 99214 OFFICE O/P EST MOD 30 MIN: CPT | Mod: 25

## 2025-01-22 RX ORDER — VIBEGRON 75 MG/1
75 TABLET, FILM COATED ORAL
Qty: 30 | Refills: 2 | Status: ACTIVE | COMMUNITY
Start: 2025-01-22 | End: 1900-01-01

## 2025-02-03 ENCOUNTER — APPOINTMENT (OUTPATIENT)
Dept: CARDIOLOGY | Facility: CLINIC | Age: 45
End: 2025-02-03
Payer: MEDICAID

## 2025-02-03 DIAGNOSIS — I10 ESSENTIAL (PRIMARY) HYPERTENSION: ICD-10-CM

## 2025-02-03 DIAGNOSIS — I49.3 VENTRICULAR PREMATURE DEPOLARIZATION: ICD-10-CM

## 2025-02-03 PROCEDURE — 93351 STRESS TTE COMPLETE: CPT

## 2025-02-14 ENCOUNTER — APPOINTMENT (OUTPATIENT)
Dept: UROGYNECOLOGY | Facility: CLINIC | Age: 45
End: 2025-02-14

## 2025-03-10 ENCOUNTER — APPOINTMENT (OUTPATIENT)
Dept: PULMONOLOGY | Facility: CLINIC | Age: 45
End: 2025-03-10
Payer: MEDICAID

## 2025-03-10 VITALS
RESPIRATION RATE: 18 BRPM | HEART RATE: 95 BPM | DIASTOLIC BLOOD PRESSURE: 78 MMHG | HEIGHT: 62 IN | SYSTOLIC BLOOD PRESSURE: 126 MMHG | WEIGHT: 200 LBS | OXYGEN SATURATION: 99 % | BODY MASS INDEX: 36.8 KG/M2

## 2025-03-10 DIAGNOSIS — I10 ESSENTIAL (PRIMARY) HYPERTENSION: ICD-10-CM

## 2025-03-10 DIAGNOSIS — Z78.9 OTHER SPECIFIED HEALTH STATUS: ICD-10-CM

## 2025-03-10 DIAGNOSIS — Z83.79 FAMILY HISTORY OF OTHER DISEASES OF THE DIGESTIVE SYSTEM: ICD-10-CM

## 2025-03-10 DIAGNOSIS — Z86.79 PERSONAL HISTORY OF OTHER DISEASES OF THE CIRCULATORY SYSTEM: ICD-10-CM

## 2025-03-10 DIAGNOSIS — Z87.59 PERSONAL HISTORY OF OTHER COMPLICATIONS OF PREGNANCY, CHILDBIRTH AND THE PUERPERIUM: ICD-10-CM

## 2025-03-10 DIAGNOSIS — Z80.49 FAMILY HISTORY OF MALIGNANT NEOPLASM OF OTHER GENITAL ORGANS: ICD-10-CM

## 2025-03-10 DIAGNOSIS — G47.33 OBSTRUCTIVE SLEEP APNEA (ADULT) (PEDIATRIC): ICD-10-CM

## 2025-03-10 PROCEDURE — 99203 OFFICE O/P NEW LOW 30 MIN: CPT

## 2025-03-10 PROCEDURE — G2211 COMPLEX E/M VISIT ADD ON: CPT | Mod: NC

## 2025-03-24 ENCOUNTER — APPOINTMENT (OUTPATIENT)
Dept: ORTHOPEDIC SURGERY | Facility: CLINIC | Age: 45
End: 2025-03-24
Payer: MEDICAID

## 2025-03-24 DIAGNOSIS — M25.562 PAIN IN LEFT KNEE: ICD-10-CM

## 2025-03-24 PROCEDURE — 99203 OFFICE O/P NEW LOW 30 MIN: CPT

## 2025-03-24 PROCEDURE — 73560 X-RAY EXAM OF KNEE 1 OR 2: CPT | Mod: LT

## 2025-03-24 RX ORDER — MELOXICAM 15 MG/1
15 TABLET ORAL DAILY
Qty: 30 | Refills: 2 | Status: ACTIVE | COMMUNITY
Start: 2025-03-24 | End: 1900-01-01

## 2025-04-08 ENCOUNTER — APPOINTMENT (OUTPATIENT)
Dept: SLEEP CENTER | Facility: HOSPITAL | Age: 45
End: 2025-04-08
Payer: MEDICAID

## 2025-04-08 ENCOUNTER — OUTPATIENT (OUTPATIENT)
Dept: OUTPATIENT SERVICES | Facility: HOSPITAL | Age: 45
LOS: 1 days | Discharge: HOME | End: 2025-04-08
Payer: MEDICAID

## 2025-04-08 DIAGNOSIS — G47.33 OBSTRUCTIVE SLEEP APNEA (ADULT) (PEDIATRIC): ICD-10-CM

## 2025-04-08 DIAGNOSIS — Z98.890 OTHER SPECIFIED POSTPROCEDURAL STATES: Chronic | ICD-10-CM

## 2025-04-08 DIAGNOSIS — Z98.891 HISTORY OF UTERINE SCAR FROM PREVIOUS SURGERY: Chronic | ICD-10-CM

## 2025-04-08 DIAGNOSIS — Z90.49 ACQUIRED ABSENCE OF OTHER SPECIFIED PARTS OF DIGESTIVE TRACT: Chronic | ICD-10-CM

## 2025-04-08 PROCEDURE — 95810 POLYSOM 6/> YRS 4/> PARAM: CPT

## 2025-04-08 PROCEDURE — 95810 POLYSOM 6/> YRS 4/> PARAM: CPT | Mod: 26

## 2025-04-10 DIAGNOSIS — G47.33 OBSTRUCTIVE SLEEP APNEA (ADULT) (PEDIATRIC): ICD-10-CM

## 2025-05-05 ENCOUNTER — APPOINTMENT (OUTPATIENT)
Dept: ORTHOPEDIC SURGERY | Facility: CLINIC | Age: 45
End: 2025-05-05

## 2025-05-19 ENCOUNTER — APPOINTMENT (OUTPATIENT)
Dept: PULMONOLOGY | Facility: CLINIC | Age: 45
End: 2025-05-19
Payer: MEDICAID

## 2025-05-19 VITALS
DIASTOLIC BLOOD PRESSURE: 84 MMHG | HEIGHT: 62 IN | OXYGEN SATURATION: 97 % | SYSTOLIC BLOOD PRESSURE: 128 MMHG | BODY MASS INDEX: 38.09 KG/M2 | RESPIRATION RATE: 14 BRPM | HEART RATE: 79 BPM | WEIGHT: 207 LBS

## 2025-05-19 PROCEDURE — G2211 COMPLEX E/M VISIT ADD ON: CPT | Mod: NC

## 2025-05-19 PROCEDURE — 99213 OFFICE O/P EST LOW 20 MIN: CPT

## 2025-05-22 ENCOUNTER — APPOINTMENT (OUTPATIENT)
Dept: CARDIOLOGY | Facility: CLINIC | Age: 45
End: 2025-05-22
Payer: MEDICAID

## 2025-05-22 ENCOUNTER — NON-APPOINTMENT (OUTPATIENT)
Age: 45
End: 2025-05-22

## 2025-05-22 VITALS — SYSTOLIC BLOOD PRESSURE: 122 MMHG | DIASTOLIC BLOOD PRESSURE: 80 MMHG

## 2025-05-22 VITALS
HEIGHT: 62 IN | WEIGHT: 209 LBS | BODY MASS INDEX: 38.46 KG/M2 | HEART RATE: 64 BPM | DIASTOLIC BLOOD PRESSURE: 80 MMHG | SYSTOLIC BLOOD PRESSURE: 144 MMHG

## 2025-05-22 DIAGNOSIS — G47.33 OBSTRUCTIVE SLEEP APNEA (ADULT) (PEDIATRIC): ICD-10-CM

## 2025-05-22 DIAGNOSIS — I10 ESSENTIAL (PRIMARY) HYPERTENSION: ICD-10-CM

## 2025-05-22 DIAGNOSIS — I49.3 VENTRICULAR PREMATURE DEPOLARIZATION: ICD-10-CM

## 2025-05-22 PROCEDURE — 99214 OFFICE O/P EST MOD 30 MIN: CPT | Mod: 25

## 2025-05-22 PROCEDURE — 93000 ELECTROCARDIOGRAM COMPLETE: CPT

## 2025-05-22 RX ORDER — VALACYCLOVIR 1 G/1
1 TABLET, FILM COATED ORAL
Qty: 21 | Refills: 0 | Status: ACTIVE | COMMUNITY
Start: 2025-02-16

## 2025-05-22 RX ORDER — TOBRAMYCIN AND DEXAMETHASONE 3; 1 MG/ML; MG/ML
0.3-0.1 SUSPENSION/ DROPS OPHTHALMIC
Qty: 5 | Refills: 0 | Status: ACTIVE | COMMUNITY
Start: 2025-01-24

## 2025-07-09 NOTE — ED PROVIDER NOTE - CHILD ABUSE FACILITY
PROGRESS NOTE    Subjective     Rose Mary is a 29 year old here for Physical   The patient presents for an annual physical exam and is interested in a breast exam.     She reports no recent episodes of severe headaches, dizziness, or lightheadedness. She also has not experienced any symptoms of fever, chills, cold, or cough. Her respiratory function is satisfactory during physical activity at work. She smokes cigarettes and has used her albuterol inhaler in the past couple of weeks. She experiences chest discomfort and rapid heartbeat at night, which she attributes to anxiety, describing it as her heart pumping faster. She does not have any urinary issues such as burning sensation or urgency. She spends a significant amount of time outdoors in the sun. She reports no leg swelling.    She has been experiencing diarrhea for the past week, with bowel movements occurring 4 to 5 times a day. She has been unable to control her bowel movements. She has been feeling nauseous and has had a decreased appetite over the past few days. She has taken over-the-counter medication for her diarrhea. She has had similar episodes of diarrhea in the past, which resolved on their own.    She reports worsening anxiety and restlessness, but cannot identify any specific triggers. She also reports feeling down and depressed, but is unsure of the cause. She finds sleep helpful when she is feeling anxious or depressed. She has support from friends and family when she is having a bad day. She reports no suicidal thoughts or self-harm attempts in the past 4 weeks. She feels safe. She recently stopped taking her depression medication and is not interested in starting a new medication at this time.    She continues to experience knee pain and has not been using her brace due to discomfort from heat and sweat. The pain is present daily. She has been taking celecoxib, which provides some relief, but she does not find it significantly more effective than  not taking it. She has 3 tablets left because there was a week when she did not take any while she was at home. She is interested in an orthopedic consultation for her knee.    She requests a refill of her oral contraceptive pills.    Her tremors have been manageable with propranolol, although she notes an increase in tremors if she misses a few doses.    She has not been consistent with her vitamin D and B12 supplements.    She requests a re-evaluation of a neck mass that has persisted, is painful at times, and has not improved since her ENT consult.     SOCIAL HISTORY  She admits to smoking.    Review of Systems  As documented above.    SDOH Every Day Smoker   Tobacco Pack Years 8.8       Objective   Vitals:    07/09/25 1018   BP: 100/60   Pulse: 78   Resp: 16   Weight: 75.1 kg (165 lb 9.1 oz)   Height: 5' 7\" (1.702 m)   BMI (Calculated): 25.93     Physical Exam  Vitals reviewed.   Constitutional:       General: She is not in acute distress.  HENT:      Head: Atraumatic.      Right Ear: Tympanic membrane normal.      Left Ear: Tympanic membrane normal.      Nose: Nose normal.      Mouth/Throat:      Mouth: Mucous membranes are moist.   Eyes:      Conjunctiva/sclera: Conjunctivae normal.   Neck:      Comments: Left neck soft tissue mass, prominent lymph nodes.   Cardiovascular:      Rate and Rhythm: Normal rate and regular rhythm.      Heart sounds: No murmur heard.  Pulmonary:      Effort: Pulmonary effort is normal. No respiratory distress.      Breath sounds: Normal breath sounds.   Abdominal:      General: There is no distension.      Palpations: Abdomen is soft. There is no mass.      Tenderness: There is no abdominal tenderness.   Genitourinary:     Comments: GYN:  Breasts symmetrical without masses, nodules, or nipple discharge bilaterally.  No axillary adenopathy.       Musculoskeletal:      Right lower leg: No edema.      Left lower leg: No edema.      Comments:  Mild pain reported upon direct palpation  over the left knee. Minimal left knee restriction on flexion. No instability. Normal gait.    Skin:     General: Skin is warm.   Neurological:      Mental Status: She is alert.   Psychiatric:         Mood and Affect: Mood normal.         Behavior: Behavior normal.         Thought Content: Thought content normal.                  ASSESSMENT AND PLAN        1. Annual physical exam.  - Thyroid function, blood count, metabolic panel, and kidney function were all within normal limits last year. Cholesterol level check conducted 3 years ago was normal.  - Physical exam today showed no concerns, including no fluid retention in ankles.  - Advised to maintain a bland diet with rice, toast, and bananas, and to increase clear fluid intake. A handout on gut health will be provided.  - Birth control pill prescription refilled. Propranolol refilled for tremor management. Lab tests will be conducted today.    2. Diarrhea.  - Reports experiencing diarrhea for the past week, with bowel movements occurring 4 to 5 times a day.  - Physical exam included abdominal palpation.  - Advised to follow a bland diet and increase clear fluid intake. A handout on gut health will be provided.  - No over-the-counter medications currently being taken for diarrhea.    3. Anxiety and depression.  - Reports worsening anxiety and feeling down, with no current medication for these symptoms.  - Physical exam included discussion of mood and anxiety symptoms.  - Advised to reach out if there is a need to start treatment for mood and anxiety.  - No immediate medication prescribed for anxiety or depression.    4. Left knee pain.  - Continues to experience left knee pain despite Celebrex start.   - Orthopedic consultation ordered for further evaluation.  - Celecoxib will be discontinued as it was not found to be significantly effective.    5.  Benign familial tremor.  - Good response to propranolol.  Continue propranolol 120 mg LA 1 daily.  - Recommend labs  including CMP and CBC.    6.  Vitamin D deficiency.  - Obtain vitamin D level in lab.    7.  B12 deficiency  - Obtain vitamin B12 and folate.    8.  Mass of left side of neck.  - Persistent   - ENT consultation visit notes reviewed.  - Neck CT ordered.     9.  Lymphadenopathy of left cervical region  - Persistent symptoms.  - Obtain neck CT.  - Obtain CBC.      1. Well female exam without gynecological exam  2. Diarrhea, unspecified type  3. Major depressive disorder, recurrent episode, moderate (CMD)  -     Comprehensive Metabolic Panel; Future  -     CBC with Automated Differential; Future  -     OFFICE OR OTHER OUTPT VISIT EST PT 20 MINS OR MORE LOW MDM LVL 3  4. Anxiety disorder, unspecified type  -     Comprehensive Metabolic Panel; Future  -     CBC with Automated Differential; Future  -     OFFICE OR OTHER OUTPT VISIT EST PT 20 MINS OR MORE LOW MDM LVL 3  5. Chronic pain of left knee  -     SERVICE TO ORTHOPEDICS  -     OFFICE OR OTHER OUTPT VISIT EST PT 20 MINS OR MORE LOW MDM LVL 3  6. Benign familial tremor  -     propranolol (INDERAL LA) 120 MG 24 hr capsule; Take 1 capsule by mouth nightly.  -     Comprehensive Metabolic Panel; Future  -     CBC with Automated Differential; Future  -     OFFICE OR OTHER OUTPT VISIT EST PT 20 MINS OR MORE LOW MDM LVL 3  7. Vitamin D deficiency  -     Vitamin D -25 Hydroxy; Future  -     OFFICE OR OTHER OUTPT VISIT EST PT 20 MINS OR MORE LOW MDM LVL 3  8. B12 deficiency  -     Vitamin B12 And Folate; Future  -     OFFICE OR OTHER OUTPT VISIT EST PT 20 MINS OR MORE LOW MDM LVL 3  9. Mass of left side of neck  -     CT NECK SOFT TISSUE W CONTRAST; Future  -     OFFICE OR OTHER OUTPT VISIT EST PT 20 MINS OR MORE LOW MDM LVL 3  10. Lymphadenopathy of left cervical region  -     CT NECK SOFT TISSUE W CONTRAST; Future  -     OFFICE OR OTHER OUTPT VISIT EST PT 20 MINS OR MORE LOW MDM LVL 3  Other orders  -     levonorgestrel-ethinyl estradiol (Aviane) 0.1-20 MG-MCG per tablet;  Take 1 tablet by mouth daily.      Return in about 1 year (around 7/9/2026) for Labs today, Annual exam.         [facility]

## 2025-07-15 ENCOUNTER — APPOINTMENT (OUTPATIENT)
Dept: SURGERY | Facility: CLINIC | Age: 45
End: 2025-07-15

## 2025-07-15 VITALS
SYSTOLIC BLOOD PRESSURE: 116 MMHG | DIASTOLIC BLOOD PRESSURE: 74 MMHG | BODY MASS INDEX: 37.54 KG/M2 | WEIGHT: 204 LBS | OXYGEN SATURATION: 99 % | HEIGHT: 62 IN | HEART RATE: 70 BPM

## 2025-07-15 PROBLEM — E66.812 CLASS 2 OBESITY: Status: ACTIVE | Noted: 2025-07-15

## 2025-07-15 RX ORDER — SEMAGLUTIDE 0.25 MG/.5ML
0.25 INJECTION, SOLUTION SUBCUTANEOUS
Qty: 1 | Refills: 0 | Status: ACTIVE | COMMUNITY
Start: 2025-07-15 | End: 1900-01-01

## 2025-07-15 RX ORDER — SEMAGLUTIDE 0.5 MG/.5ML
0.5 INJECTION, SOLUTION SUBCUTANEOUS
Qty: 1 | Refills: 2 | Status: ACTIVE | COMMUNITY
Start: 2025-07-15 | End: 1900-01-01

## 2025-08-11 ENCOUNTER — APPOINTMENT (OUTPATIENT)
Dept: PULMONOLOGY | Facility: CLINIC | Age: 45
End: 2025-08-11
Payer: MEDICAID

## 2025-08-11 VITALS
WEIGHT: 204 LBS | HEIGHT: 62 IN | SYSTOLIC BLOOD PRESSURE: 118 MMHG | OXYGEN SATURATION: 97 % | RESPIRATION RATE: 15 BRPM | HEART RATE: 84 BPM | BODY MASS INDEX: 37.54 KG/M2 | DIASTOLIC BLOOD PRESSURE: 82 MMHG

## 2025-08-11 DIAGNOSIS — G47.33 OBSTRUCTIVE SLEEP APNEA (ADULT) (PEDIATRIC): ICD-10-CM

## 2025-08-11 PROCEDURE — 99213 OFFICE O/P EST LOW 20 MIN: CPT

## 2025-08-11 PROCEDURE — G2211 COMPLEX E/M VISIT ADD ON: CPT | Mod: NC

## 2025-08-25 ENCOUNTER — APPOINTMENT (OUTPATIENT)
Dept: SURGERY | Facility: CLINIC | Age: 45
End: 2025-08-25
Payer: MEDICAID

## 2025-08-25 PROCEDURE — 97802 MEDICAL NUTRITION INDIV IN: CPT | Mod: 95
